# Patient Record
Sex: MALE | Race: WHITE | Employment: FULL TIME | URBAN - METROPOLITAN AREA
[De-identification: names, ages, dates, MRNs, and addresses within clinical notes are randomized per-mention and may not be internally consistent; named-entity substitution may affect disease eponyms.]

---

## 2018-09-21 ENCOUNTER — OFFICE VISIT (OUTPATIENT)
Dept: FAMILY MEDICINE CLINIC | Age: 43
End: 2018-09-21

## 2018-09-21 VITALS
BODY MASS INDEX: 31.09 KG/M2 | TEMPERATURE: 98.8 F | DIASTOLIC BLOOD PRESSURE: 102 MMHG | RESPIRATION RATE: 20 BRPM | HEART RATE: 105 BPM | SYSTOLIC BLOOD PRESSURE: 144 MMHG | OXYGEN SATURATION: 97 % | WEIGHT: 204.5 LBS

## 2018-09-21 DIAGNOSIS — Z13.220 LIPID SCREENING: ICD-10-CM

## 2018-09-21 DIAGNOSIS — F17.200 TOBACCO DEPENDENCE: ICD-10-CM

## 2018-09-21 DIAGNOSIS — K59.00 CONSTIPATION, UNSPECIFIED CONSTIPATION TYPE: ICD-10-CM

## 2018-09-21 DIAGNOSIS — R10.13 ACUTE EPIGASTRIC PAIN: Primary | ICD-10-CM

## 2018-09-21 PROCEDURE — 99214 OFFICE O/P EST MOD 30 MIN: CPT | Performed by: FAMILY MEDICINE

## 2018-09-21 PROCEDURE — G8417 CALC BMI ABV UP PARAM F/U: HCPCS | Performed by: FAMILY MEDICINE

## 2018-09-21 PROCEDURE — G8427 DOCREV CUR MEDS BY ELIG CLIN: HCPCS | Performed by: FAMILY MEDICINE

## 2018-09-21 PROCEDURE — 4004F PT TOBACCO SCREEN RCVD TLK: CPT | Performed by: FAMILY MEDICINE

## 2018-09-21 RX ORDER — OMEPRAZOLE 20 MG/1
20 CAPSULE, DELAYED RELEASE ORAL
Qty: 14 CAPSULE | Refills: 0 | Status: ON HOLD | OUTPATIENT
Start: 2018-09-21 | End: 2018-10-09

## 2018-09-21 RX ORDER — LACTULOSE 10 G/15ML
20 SOLUTION ORAL 2 TIMES DAILY PRN
Qty: 1 BOTTLE | Refills: 3 | Status: ON HOLD | OUTPATIENT
Start: 2018-09-21 | End: 2018-10-09 | Stop reason: ALTCHOICE

## 2018-09-21 RX ORDER — VARENICLINE TARTRATE 1 MG/1
1 TABLET, FILM COATED ORAL 2 TIMES DAILY
Qty: 60 TABLET | Refills: 3 | Status: SHIPPED | OUTPATIENT
Start: 2018-09-21 | End: 2019-01-30

## 2018-09-21 RX ORDER — VARENICLINE TARTRATE 25 MG
KIT ORAL
Qty: 1 EACH | Refills: 0 | Status: SHIPPED | OUTPATIENT
Start: 2018-09-21 | End: 2019-01-30

## 2018-09-21 NOTE — PATIENT INSTRUCTIONS
Flory  [address] [employer] [address], 10 [facility], [place], [address]  Phone: [phone] Fax: [phone]  Mon.Fri. 7 a. m.6 p.m. Sat. 8 a.m.on    [facility]  [address] [place] [address], [address]  Phone: [phone]  Mon.Fri. 7:30 a. m.4 p.m. [facility]  [address], [facility] [address]  Phone: [phone]    Fax: [phone]  Monday-Friday 8:00 a.m.- 4:30 p.m. 1418 [facility]  Critical access hospital 18, [address] [place] [address], 6500 [facility] [address]  Phone: [phone]  Mon.Fri. 8 a. m.8 p.m. Sat. 9 a. m.5 p.m. 6900 [facility]  [address]  [facility]. [address]  Phone: [phone] Fax: [phone]  Mon.Fri. 8 a. m.5 p.m. [facility]  [address] [place] [address]  Phone: [phone] Fax: [phone]  Mon.Fri. 7:30 a. m.4 p.m. [facility]  [address], [address], [address]  Phone: [phone]  Trae Gonzalez., Tue., Thu., Fri. 7:30 a. m.5 p.m.  Wed. 7:30 a.m.[facility]  [address]  Phone: [phone] Fax: [phone]  Mon.Fri. 7:30 a. m.4 p.m. [facility]  [address]  Phone: [phone] Fax: [phone]  Mon.Fri. 8 a. m.4:30 p.m. 506 [facility] and [facility] Services  [address], 414 [facility], [facility]  Phone: [phone] Fax: [phone]  Mon.Fri. 8 a. m.4:30 p.m. 1315 [facility]  [address] [place] [address], [address]  Phone: [phone] Fax: [phone]  Mon.Fri. 7 a. m.5 p.m. Sat. 8 a. m.4 p.m. [address] [place] [address]  Phone: [phone]  Mon.Fri. 7:30 a. m.4 p.m.     [facility]  [address] [place] [address]  Phone:

## 2018-09-21 NOTE — PROGRESS NOTES
medication use were addressed today. Understanding was acknowledged. Patient asked to follow-up if condition(s) do not improve as anticipated. Have her/him look online chantix. com for different methods of quitting. Most people choose gradual quit method: smoke as usual.  She/he will find that she will smoke less and might even be off cigarettes by the end of the fist month. It is important to fill the continuation pack and to take for at least a total of 3 months even if she/he has quit smoking on month #1. Common side effects are upset stomach and vivid dreams. If side effects are becoming excessive, temporarily back down to daily instead of BID, then eventually get back to twice a day. #3: The risks, benefits, potential side effects and barriers to medication use were addressed today. Understanding was acknowledged. Patient asked to follow-up if condition(s) do not improve as anticipated. PLAN          See rest of plan under patient instructions. Return if symptoms worsen or fail to improve. Patient Instructions       Kelli Cavanaugh 27 [address] [place] [address], 400 [facility]  Phone: [phone] Fax: [phone]  Mon.Fri. 7 a. m.6 p.m. Sat. 8 a.m.[facility]  [address] [address]  Phone: [phone]  Mon.Fri. 7:30 a. m.4 p.m. [place] [address]  Phone: [phone]    Fax: [phone]  Monday-Friday 8:00 a.m.- 4:30 p.m. 1418 [facility]  [address], 6500 [facility] [address]  Phone: [phone]  Mon.Fri. 8 a. m.8 p.m. Sat. 9 a. m.5 p.m. [address]  [facility]. [address]  Phone: [phone] Fax: [phone]  Mon.Fri. 8 a. m.5 p.m.     [address]  Phone: [phone] Fax: [phone]  Mon.Fri. 7:30 a. m.4 p.m. [facility]  555 [facility], [address], [address]  Phone: [phone]  Suzie Brooks., Tue., Thu., Fri. 7:30 a. m.5 p.m.  Wed. 7:30 a.m.[facility]  [address]  [place], [facility]  Phone: [phone] Fax: [phone]  Mon.Fri. 7:30 a. m.4 p.m. [facility]  [address], [facility] Partners[facility] [address]  Phone: [phone] Fax: [phone]  Mon.Fri. 8 a. m.4:30 p.m. 506 [facility] and [facility] Services  [facility] 189, 914 [facility], [facility]  Phone: [phone] Fax: [phone]  Mon.Fri. 8 a. m.4:30 p.m. 1315 [facility] and Urgent Care  [address] [place] [address] [address]  Phone: [phone] Fax: [phone]  Mon.Fri. 7 a. m.5 p.m. Sat. 8 a. m.4 p.m. [address]  Phone: [phone]  Mon.Fri. 7:30 a. m.4 p.m. [address] [facility] [address]  11[address] [facility]  Phone: [phone] Fax: [phone]  Mon.Fri. 7 a. m.5 p.m. Sat. 8 a.m.Noon [facility] [place], [address]  [place], [address]  Phone: [phone] Fax: [phone]  Mon.Fri. 7 a. m.5 p.m. [facility]""  [address] [facility] [address] [place], 400 [facility]  Phone: [phone] Fax: [phone]  Mon.Fri. 7 a. m.5 p.m. [address] [facility] [address]  475 [facility] [address], [place], 2501 [facility]  Phone: [phone]  Mon.Fri. 6:30 a. m.6 p.m. Sat. 7 a. m. 1 p.m. [facility] 75, [place], [facility]  Phone: [phone]  Open 24/7    [address], [address]  Phone: [phone]  Mon.Fri. 6 a. m.7 p.m. Sat. 7 a. m.3 p.m. [facility]  [address] [facility]  Phone: [phone]  Mon.Fri. 6 a. m. 11 p.m. Sat. Sun. 6:30 a. m. 88

## 2018-09-27 DIAGNOSIS — R10.13 ACUTE EPIGASTRIC PAIN: ICD-10-CM

## 2018-09-27 DIAGNOSIS — Z13.220 LIPID SCREENING: ICD-10-CM

## 2018-09-28 DIAGNOSIS — R74.8 ELEVATED LIPASE: Primary | ICD-10-CM

## 2018-09-28 DIAGNOSIS — R73.9 HYPERGLYCEMIA: ICD-10-CM

## 2018-09-28 LAB
A/G RATIO: 2.4 (ref 1.1–2.2)
ALBUMIN SERPL-MCNC: 4.5 G/DL (ref 3.4–5)
ALP BLD-CCNC: 94 U/L (ref 40–129)
ALT SERPL-CCNC: 19 U/L (ref 10–40)
ANION GAP SERPL CALCULATED.3IONS-SCNC: 11 MMOL/L (ref 3–16)
AST SERPL-CCNC: 12 U/L (ref 15–37)
BASOPHILS ABSOLUTE: 0.1 K/UL (ref 0–0.2)
BASOPHILS RELATIVE PERCENT: 1.3 %
BILIRUB SERPL-MCNC: <0.2 MG/DL (ref 0–1)
BUN BLDV-MCNC: 16 MG/DL (ref 7–20)
CALCIUM SERPL-MCNC: 9.6 MG/DL (ref 8.3–10.6)
CHLORIDE BLD-SCNC: 103 MMOL/L (ref 99–110)
CHOLESTEROL, TOTAL: 161 MG/DL (ref 0–199)
CO2: 27 MMOL/L (ref 21–32)
CREAT SERPL-MCNC: 1 MG/DL (ref 0.9–1.3)
EOSINOPHILS ABSOLUTE: 0.2 K/UL (ref 0–0.6)
EOSINOPHILS RELATIVE PERCENT: 2.1 %
GFR AFRICAN AMERICAN: >60
GFR NON-AFRICAN AMERICAN: >60
GLOBULIN: 1.9 G/DL
GLUCOSE BLD-MCNC: 282 MG/DL (ref 70–99)
HCT VFR BLD CALC: 44.2 % (ref 40.5–52.5)
HDLC SERPL-MCNC: 24 MG/DL (ref 40–60)
HEMOGLOBIN: 14.9 G/DL (ref 13.5–17.5)
LDL CHOLESTEROL CALCULATED: 117 MG/DL
LIPASE: 144 U/L (ref 13–60)
LYMPHOCYTES ABSOLUTE: 2.7 K/UL (ref 1–5.1)
LYMPHOCYTES RELATIVE PERCENT: 30.1 %
MCH RBC QN AUTO: 30.1 PG (ref 26–34)
MCHC RBC AUTO-ENTMCNC: 33.8 G/DL (ref 31–36)
MCV RBC AUTO: 89 FL (ref 80–100)
MONOCYTES ABSOLUTE: 0.6 K/UL (ref 0–1.3)
MONOCYTES RELATIVE PERCENT: 7.2 %
NEUTROPHILS ABSOLUTE: 5.2 K/UL (ref 1.7–7.7)
NEUTROPHILS RELATIVE PERCENT: 59.3 %
PDW BLD-RTO: 12.4 % (ref 12.4–15.4)
PLATELET # BLD: 245 K/UL (ref 135–450)
PMV BLD AUTO: 9.2 FL (ref 5–10.5)
POTASSIUM SERPL-SCNC: 4.5 MMOL/L (ref 3.5–5.1)
RBC # BLD: 4.96 M/UL (ref 4.2–5.9)
SODIUM BLD-SCNC: 141 MMOL/L (ref 136–145)
TOTAL PROTEIN: 6.4 G/DL (ref 6.4–8.2)
TRIGL SERPL-MCNC: 102 MG/DL (ref 0–150)
VLDLC SERPL CALC-MCNC: 20 MG/DL
WBC # BLD: 8.8 K/UL (ref 4–11)

## 2018-10-04 ENCOUNTER — HOSPITAL ENCOUNTER (OUTPATIENT)
Dept: CT IMAGING | Age: 43
Discharge: HOME | End: 2018-10-04
Payer: COMMERCIAL

## 2018-10-04 ENCOUNTER — TELEPHONE (OUTPATIENT)
Dept: FAMILY MEDICINE CLINIC | Age: 43
End: 2018-10-04

## 2018-10-04 DIAGNOSIS — R74.8 ELEVATED LIPASE: ICD-10-CM

## 2018-10-04 DIAGNOSIS — K85.90 ACUTE PANCREATITIS, UNSPECIFIED COMPLICATION STATUS, UNSPECIFIED PANCREATITIS TYPE: Primary | ICD-10-CM

## 2018-10-04 DIAGNOSIS — R73.9 HYPERGLYCEMIA: ICD-10-CM

## 2018-10-04 PROCEDURE — 6360000004 HC RX CONTRAST MEDICATION: Performed by: FAMILY MEDICINE

## 2018-10-04 PROCEDURE — 74177 CT ABD & PELVIS W/CONTRAST: CPT

## 2018-10-04 RX ORDER — ACETAMINOPHEN AND CODEINE PHOSPHATE 300; 30 MG/1; MG/1
1-2 TABLET ORAL EVERY 6 HOURS PRN
Qty: 18 TABLET | Refills: 0 | Status: SHIPPED | OUTPATIENT
Start: 2018-10-04 | End: 2019-01-30 | Stop reason: SDUPTHER

## 2018-10-04 RX ADMIN — IOPAMIDOL 80 ML: 755 INJECTION, SOLUTION INTRAVENOUS at 12:37

## 2018-10-04 RX ADMIN — IOHEXOL 50 ML: 240 INJECTION, SOLUTION INTRATHECAL; INTRAVASCULAR; INTRAVENOUS; ORAL at 12:38

## 2018-10-04 NOTE — TELEPHONE ENCOUNTER
I spoke to patient for 15 minutes about the CT scan and next steps. \I informed him of the pancreatitis findings. I informed them of gallstones but no evidence of active gallbladder disease. His pain is sometimes moderate and epigastric region but without any vomiting. He will have medication off next week    I advised: going on a pancreatitis diet. He agrees to meet with a gastroenterologist.  I will refer him to one of our [facility] gastroenterologist in [facility]. Agrees to abstain from alcohol as he only drinks 3 times in the last 3 months. I urged him to quit smoking and he is on day 8 of Chantix.

## 2018-10-05 ENCOUNTER — HOSPITAL ENCOUNTER (OUTPATIENT)
Age: 43
Discharge: HOME | End: 2018-10-05
Payer: COMMERCIAL

## 2018-10-05 ENCOUNTER — INITIAL CONSULT (OUTPATIENT)
Dept: GASTROENTEROLOGY | Age: 43
End: 2018-10-05
Payer: COMMERCIAL

## 2018-10-05 VITALS
SYSTOLIC BLOOD PRESSURE: 130 MMHG | BODY MASS INDEX: 30.62 KG/M2 | DIASTOLIC BLOOD PRESSURE: 80 MMHG | HEIGHT: 68 IN | WEIGHT: 202 LBS

## 2018-10-05 DIAGNOSIS — K76.0 FATTY LIVER: ICD-10-CM

## 2018-10-05 DIAGNOSIS — K85.00 IDIOPATHIC ACUTE PANCREATITIS, UNSPECIFIED COMPLICATION STATUS: Primary | ICD-10-CM

## 2018-10-05 DIAGNOSIS — R73.9 HYPERGLYCEMIA: ICD-10-CM

## 2018-10-05 LAB — GLUCOSE BLD-MCNC: 255 MG/DL (ref 70–99)

## 2018-10-05 PROCEDURE — G8417 CALC BMI ABV UP PARAM F/U: HCPCS | Performed by: INTERNAL MEDICINE

## 2018-10-05 PROCEDURE — 99204 OFFICE O/P NEW MOD 45 MIN: CPT | Performed by: INTERNAL MEDICINE

## 2018-10-05 PROCEDURE — 4004F PT TOBACCO SCREEN RCVD TLK: CPT | Performed by: INTERNAL MEDICINE

## 2018-10-05 PROCEDURE — G8484 FLU IMMUNIZE NO ADMIN: HCPCS | Performed by: INTERNAL MEDICINE

## 2018-10-05 PROCEDURE — 36415 COLL VENOUS BLD VENIPUNCTURE: CPT

## 2018-10-05 PROCEDURE — G8427 DOCREV CUR MEDS BY ELIG CLIN: HCPCS | Performed by: INTERNAL MEDICINE

## 2018-10-05 PROCEDURE — 82947 ASSAY GLUCOSE BLOOD QUANT: CPT

## 2018-10-05 RX ORDER — OMEPRAZOLE 40 MG/1
40 CAPSULE, DELAYED RELEASE ORAL DAILY
Qty: 30 CAPSULE | Refills: 1 | Status: SHIPPED | OUTPATIENT
Start: 2018-10-05 | End: 2018-11-19 | Stop reason: SDUPTHER

## 2018-10-05 NOTE — LETTER
[address]  [place], [facility]  Estefani Rivero [phone]  F [phone]    10/05/18  Patient: Maura Ferraro   MR Number: [id number]   YOB: 1975   Date of Visit: 10/5/18     Dear Dr. Taniya Paez, DO    Thank you for the request for consultation for Maura Ferraro to me for the evaluation of   Chief Complaint   Patient presents with   [address]     NP- acute pancreatitis   . Below are the relevant portions of my assessment and plan of care. FINAL DIAGNOSIS/Assessment   Diagnosis Orders   1. Idiopathic acute pancreatitis, unspecified complication status  omeprazole (PRILOSEC) 40 MG delayed release capsule    EGD       VISIT ORDERS/Plan  Orders Placed This Encounter   Procedures    EGD     Standing Status:   Future     Standing Expiration Date:   11/5/2018     Order Specific Question:   Screening or Diagnostic? Answer:   Diagnostic       If you have questions, please do not hesitate to call me. I look forward to following Maura Ferraro along with you.     Sincerely,        Sandra Mayo 21 10/5/18 3:28 PM

## 2018-10-05 NOTE — LETTER
EGD PREP [address] ENDOSCOPY    Your EGD is scheduled on: ____10/9/18____    __Viry/Simone______  Arrival Time: ___9:20 AM___   DO NOT EAT OR DRINK (INCLUDING WATER)  AFTER: midnight, the night before your procedure  's Name_[facility] [phone]  [facility]- [phone]    Keep these papers together; REVIEW ALL OF THEM AT LEAST 7 DAYS BEFORE THE PROCEDURE. Please complete all paperwork; including a current list of your medications, to avoid delays in the admission process. The following instructions must be followed in order to ensure your procedure has optimal outcomes. - KEEP YOUR APPOINTMENT. If for any reason, you are unable to keep your appointment, please notify us within 72 hours before your procedure. - You MUST have a responsible adult to drive you, who MUST remain at our facility the ENTIRE time. If not the procedure will be cancelled. You may go by taxi ONLY if you have a responsible adult with you. You may experience light headedness, dizziness etc., therefore you should have a responsible adult remain with you until the morning after your procedure. - Bring your insurance card and 's license. Call your insurance carrier to verify your benefits, and confirm that our facility is in your network, prior to the procedure date to ensure coverage. The facility name is listed as [facility]010 and the tax ID# is [id number].  - Due to the safety and privacy of our patients, only one visitor is allowed in the recovery area after the procedure. The center will not be responsible for lost valuables so please leave them at home.   - If you currently take Aggrenox, Dipyridamole, Persantine, Fondaparinux sodium (Arixtra), Dalteparin sodium Bella Margarita), Warfarin (Coumadin), Clopidogrel (Plavix), Prasugrel (Effient), Enoxaparin, Lovenox, or Heparin, Cilostazol (Pletal), Rivoroxaban (Xarelto), Desirudin (Iprivask), Cyclopentyltrazolopyrimide (Ticagrelor or Brilinta), Cangrelor (Kengreal), Dabigatran (Pradaxa), Apixaban (Eliquis), Edoxaban (Savaysa)you should have received instructions regarding if and when to discontinue the medication. If you have not, or do not clearly understand the instructions, please call the office for clarification. MORNING OF PROCEDURE  1. Take your Blood pressure, Heart and Seizure medication the morning of the procedure with sips of water. 2. Bring inhalers with you. 3. Do not take your Diabetic medication the morning of procedure. Dear Patient,      Magnus Edwards will receive a call from the [facility] pre-registration department prior to your GI procedure. This will help streamline your check-in process on the day of service. During this call a skilled associate will review your demographic and insurance benefits information. The associate will answer any question pertaining to your insurance contract, such as deductible/co-insurance/ co-pay or any other financial concerns. They may offer an amount that you could pay on the day of surgery but this is not a requirement. Thank you for allowing [facility] to be part of your [address]  [address], [facility] [address] Critical access hospital Is located at the intersection of [address] [facility] and [address], next to [facility]. From [address]: Exit at [employer]. Travel on [address] past [facility] and turn right onto [address].  Then turn left into the main driveway facing the [facility], bare to the right of the driveway and look for the building to the right of the hospital.

## 2018-10-07 PROBLEM — K85.00 IDIOPATHIC ACUTE PANCREATITIS: Status: ACTIVE | Noted: 2018-10-07

## 2018-10-07 PROBLEM — K76.0 FATTY LIVER: Status: ACTIVE | Noted: 2018-10-07

## 2018-10-07 NOTE — PROGRESS NOTES
Please call patient with results. Since this is fasting, he now has diabetes and needs to follow up with his pcp.

## 2018-10-09 ENCOUNTER — HOSPITAL ENCOUNTER (OUTPATIENT)
Age: 43
Setting detail: OUTPATIENT SURGERY
Discharge: HOME | End: 2018-10-09
Attending: INTERNAL MEDICINE | Admitting: INTERNAL MEDICINE
Payer: COMMERCIAL

## 2018-10-09 ENCOUNTER — TELEPHONE (OUTPATIENT)
Dept: SURGERY | Age: 43
End: 2018-10-09

## 2018-10-09 VITALS
BODY MASS INDEX: 30.62 KG/M2 | DIASTOLIC BLOOD PRESSURE: 83 MMHG | WEIGHT: 202 LBS | OXYGEN SATURATION: 93 % | TEMPERATURE: 98 F | HEIGHT: 68 IN | HEART RATE: 93 BPM | RESPIRATION RATE: 16 BRPM | SYSTOLIC BLOOD PRESSURE: 132 MMHG

## 2018-10-09 DIAGNOSIS — K85.00 IDIOPATHIC ACUTE PANCREATITIS, UNSPECIFIED COMPLICATION STATUS: Primary | ICD-10-CM

## 2018-10-09 PROCEDURE — 7100000010 HC PHASE II RECOVERY - FIRST 15 MIN: Performed by: INTERNAL MEDICINE

## 2018-10-09 PROCEDURE — 2709999900 HC NON-CHARGEABLE SUPPLY: Performed by: INTERNAL MEDICINE

## 2018-10-09 PROCEDURE — 99152 MOD SED SAME PHYS/QHP 5/>YRS: CPT | Performed by: INTERNAL MEDICINE

## 2018-10-09 PROCEDURE — 6360000002 HC RX W HCPCS: Performed by: INTERNAL MEDICINE

## 2018-10-09 PROCEDURE — 43239 EGD BIOPSY SINGLE/MULTIPLE: CPT | Performed by: INTERNAL MEDICINE

## 2018-10-09 PROCEDURE — 3609012400 HC EGD TRANSORAL BIOPSY SINGLE/MULTIPLE: Performed by: INTERNAL MEDICINE

## 2018-10-09 PROCEDURE — 7100000011 HC PHASE II RECOVERY - ADDTL 15 MIN: Performed by: INTERNAL MEDICINE

## 2018-10-09 PROCEDURE — 88305 TISSUE EXAM BY PATHOLOGIST: CPT

## 2018-10-09 PROCEDURE — 82787 IGG 1 2 3 OR 4 EACH: CPT

## 2018-10-09 PROCEDURE — 2580000003 HC RX 258: Performed by: INTERNAL MEDICINE

## 2018-10-09 RX ORDER — FENTANYL CITRATE 50 UG/ML
INJECTION, SOLUTION INTRAMUSCULAR; INTRAVENOUS PRN
Status: DISCONTINUED | OUTPATIENT
Start: 2018-10-09 | End: 2018-10-09 | Stop reason: HOSPADM

## 2018-10-09 RX ORDER — SODIUM CHLORIDE, SODIUM LACTATE, POTASSIUM CHLORIDE, CALCIUM CHLORIDE 600; 310; 30; 20 MG/100ML; MG/100ML; MG/100ML; MG/100ML
INJECTION, SOLUTION INTRAVENOUS CONTINUOUS
Status: DISCONTINUED | OUTPATIENT
Start: 2018-10-09 | End: 2018-10-09 | Stop reason: HOSPADM

## 2018-10-09 RX ORDER — MIDAZOLAM HYDROCHLORIDE 5 MG/ML
INJECTION INTRAMUSCULAR; INTRAVENOUS PRN
Status: DISCONTINUED | OUTPATIENT
Start: 2018-10-09 | End: 2018-10-09 | Stop reason: HOSPADM

## 2018-10-09 RX ADMIN — SODIUM CHLORIDE, POTASSIUM CHLORIDE, SODIUM LACTATE AND CALCIUM CHLORIDE: 600; 310; 30; 20 INJECTION, SOLUTION INTRAVENOUS at 10:42

## 2018-10-09 ASSESSMENT — PAIN - FUNCTIONAL ASSESSMENT: PAIN_FUNCTIONAL_ASSESSMENT: 0-10

## 2018-10-11 ENCOUNTER — TELEPHONE (OUTPATIENT)
Dept: FAMILY MEDICINE CLINIC | Age: 43
End: 2018-10-11

## 2018-10-11 DIAGNOSIS — K85.00 IDIOPATHIC ACUTE PANCREATITIS, UNSPECIFIED COMPLICATION STATUS: Primary | ICD-10-CM

## 2018-10-11 LAB — MISCELLANEOUS LAB TEST ORDER: NORMAL

## 2018-10-16 ENCOUNTER — OFFICE VISIT (OUTPATIENT)
Dept: FAMILY MEDICINE CLINIC | Age: 43
End: 2018-10-16
Payer: COMMERCIAL

## 2018-10-16 VITALS
HEIGHT: 68 IN | BODY MASS INDEX: 30.8 KG/M2 | WEIGHT: 203.2 LBS | DIASTOLIC BLOOD PRESSURE: 93 MMHG | OXYGEN SATURATION: 95 % | HEART RATE: 106 BPM | SYSTOLIC BLOOD PRESSURE: 130 MMHG | TEMPERATURE: 98.5 F

## 2018-10-16 DIAGNOSIS — E11.9 NEW ONSET TYPE 2 DIABETES MELLITUS (HCC): Primary | ICD-10-CM

## 2018-10-16 LAB — HBA1C MFR BLD: 12.2 %

## 2018-10-16 PROCEDURE — G8484 FLU IMMUNIZE NO ADMIN: HCPCS | Performed by: FAMILY MEDICINE

## 2018-10-16 PROCEDURE — 4004F PT TOBACCO SCREEN RCVD TLK: CPT | Performed by: FAMILY MEDICINE

## 2018-10-16 PROCEDURE — G8427 DOCREV CUR MEDS BY ELIG CLIN: HCPCS | Performed by: FAMILY MEDICINE

## 2018-10-16 PROCEDURE — G8417 CALC BMI ABV UP PARAM F/U: HCPCS | Performed by: FAMILY MEDICINE

## 2018-10-16 PROCEDURE — 99214 OFFICE O/P EST MOD 30 MIN: CPT | Performed by: FAMILY MEDICINE

## 2018-10-16 PROCEDURE — 83036 HEMOGLOBIN GLYCOSYLATED A1C: CPT | Performed by: FAMILY MEDICINE

## 2018-10-16 PROCEDURE — 2022F DILAT RTA XM EVC RTNOPTHY: CPT | Performed by: FAMILY MEDICINE

## 2018-10-16 PROCEDURE — 3046F HEMOGLOBIN A1C LEVEL >9.0%: CPT | Performed by: FAMILY MEDICINE

## 2018-10-16 RX ORDER — PIOGLITAZONEHYDROCHLORIDE 30 MG/1
30 TABLET ORAL DAILY
Qty: 30 TABLET | Refills: 3 | Status: SHIPPED | OUTPATIENT
Start: 2018-10-16 | End: 2018-12-03

## 2018-10-16 ASSESSMENT — PATIENT HEALTH QUESTIONNAIRE - PHQ9
1. LITTLE INTEREST OR PLEASURE IN DOING THINGS: 0
2. FEELING DOWN, DEPRESSED OR HOPELESS: 0
SUM OF ALL RESPONSES TO PHQ QUESTIONS 1-9: 0
SUM OF ALL RESPONSES TO PHQ QUESTIONS 1-9: 0
SUM OF ALL RESPONSES TO PHQ9 QUESTIONS 1 & 2: 0

## 2018-10-16 NOTE — PATIENT INSTRUCTIONS
1) Do what you can to ease and stop smoking. 2) Plan bloodwork in 6 to 8 weeks. 3) Begin the Actos. Take once a day. 4) Keep f/u plans with your digestive doctor to include the MRI. 5) Follow-up in this clinic a few days after you bloodwork in December. 6) Review the booklet. Let your PCP know at anytime if you want to meet with a diabetes educator. 7) A diet of more protein and less carbs is advised.

## 2018-10-16 NOTE — PROGRESS NOTES
[facility]  Progress Note  Lyudmila DO Gela Barboza  1975    10/17/18    Chief Complaint:   Gela Silveira is a 39 y.o. male who is here for new onset diabetes    HPI:   Diabetes Mellitus Type II, Initial Visit: Patient here for an initial evaluation of Type 2 diabetes mellitus. Current symptoms/problems include hyperglycemia and and abdominal pain that have since improved and have been improving and has had GI workup to include EGD showing gastritis and scheduled MRI of abdomen. Symptoms have been present for about 1 month and improvinig. The patient was initially diagnosed with Type 2 diabetes mellitus based on the following criteria:  Glucose > 200 and A1C in office of 12%. Known diabetic complications: none  Cardiovascular risk factors: male gender  Current diabetic medications include none. Eye exam current (within one year): no  Weight trend: stable  Prior visit with dietician: no  Current diet: in general, an \"unhealthy\" diet  Current exercise: no regular exercise and has job with irregular hours. Current monitoring regimen: none  Home blood sugar records: patient does not test  Any episodes of hypoglycemia? no      Lab Results   Component Value Date    LABA1C 12.2 10/16/2018     Lab Results   Component Value Date    CREATININE 1.0 09/27/2018     Lab Results   Component Value Date    ALT 19 09/27/2018    AST 12 (L) 09/27/2018     Lab Results   Component Value Date    CHOL 161 09/27/2018    TRIG 102 09/27/2018    HDL 24 (L) 09/27/2018    LDLCALC 117 (H) 09/27/2018       Is He on ACE inhibitor or angiotensin II receptor blocker? No         Did biometric screening with wellness years ago. ROS negative for headache, vision changes, chest pain, shortness of breath, abdominal pain, urinary sx, bowel changes. Past medical, surgical, and social history reviewed. Medications and allergies reviewed.     Allergies   Allergen Reactions    Sulfa Antibiotics Itching and Other (See Comments)     Skin turns red     Prior to Visit Medications    Medication Sig Taking? Authorizing Provider   pioglitazone (ACTOS) 30 MG tablet Take 1 tablet by mouth daily Yes Maritza Mcclelland DO   omeprazole (PRILOSEC) 40 MG delayed release capsule Take 1 capsule by mouth daily Yes Meena Kennedy MD   varenicline (CHANTIX STARTING MONTH PAK) 0.5 MG X 11 & 1 MG X 42 tablet Take as directed on package. Yes Maritza Mcclelland DO   varenicline (CHANTIX CONTINUING MONTH PAK) 1 MG tablet Take 1 tablet by mouth 2 times daily Yes Maritza Mcclelland DO          Vitals:    10/16/18 1445   BP: (!) 130/93   Site: Left Upper Arm   Position: Sitting   Cuff Size: Large Adult   Pulse: 106   Temp: 98.5 °F (36.9 °C)   TempSrc: Oral   SpO2: 95%   Weight: 203 lb 3.2 oz (92.2 kg)   Height: 5' 8\" (1.727 m)      Wt Readings from Last 3 Encounters:   10/16/18 203 lb 3.2 oz (92.2 kg)   10/09/18 202 lb (91.6 kg)   10/05/18 202 lb (91.6 kg)     BP Readings from Last 3 Encounters:   10/16/18 (!) 130/93   10/09/18 132/83   10/05/18 130/80       Patient Active Problem List   Diagnosis    Tobacco dependence    Fatty liver    Idiopathic acute pancreatitis    Gastritis without bleeding    Schatzki's ring of distal esophagus         There is no immunization history on file for this patient. No past medical history on file. Past Surgical History:   Procedure Laterality Date    UPPER GASTROINTESTINAL ENDOSCOPY N/A 10/9/2018    EGD BIOPSY [facility] Meena Kennedy MD [facility]     Family History   Problem Relation Age of Onset    Cancer Father 61    Diabetes Brother     Diabetes Maternal Grandmother     Diabetes Paternal Grandmother      Social History     Social History    Marital status:      Spouse name: N/A    Number of children: N/A    Years of education: N/A     Occupational History    Not on file.      Social History Main Topics    Smoking status: Current

## 2018-10-26 ENCOUNTER — HOSPITAL ENCOUNTER (OUTPATIENT)
Dept: MRI IMAGING | Age: 43
Discharge: HOME | End: 2018-10-26
Payer: COMMERCIAL

## 2018-10-26 DIAGNOSIS — K85.00 IDIOPATHIC ACUTE PANCREATITIS, UNSPECIFIED COMPLICATION STATUS: ICD-10-CM

## 2018-10-26 PROCEDURE — 74181 MRI ABDOMEN W/O CONTRAST: CPT

## 2018-10-29 ENCOUNTER — TELEPHONE (OUTPATIENT)
Dept: GASTROENTEROLOGY | Age: 43
End: 2018-10-29

## 2018-10-30 ENCOUNTER — TELEPHONE (OUTPATIENT)
Dept: SURGERY | Age: 43
End: 2018-10-30

## 2018-11-08 ENCOUNTER — HOSPITAL ENCOUNTER (OUTPATIENT)
Dept: ENDOSCOPY | Age: 43
Setting detail: OUTPATIENT SURGERY
Discharge: HOME | End: 2018-11-08
Payer: COMMERCIAL

## 2018-11-08 PROCEDURE — 91200 LIVER ELASTOGRAPHY: CPT | Performed by: INTERNAL MEDICINE

## 2018-11-08 PROCEDURE — 91200 LIVER ELASTOGRAPHY: CPT

## 2018-11-09 ENCOUNTER — TELEPHONE (OUTPATIENT)
Dept: GASTROENTEROLOGY | Age: 43
End: 2018-11-09

## 2018-11-15 ENCOUNTER — TELEPHONE (OUTPATIENT)
Dept: FAMILY MEDICINE CLINIC | Age: 43
End: 2018-11-15

## 2018-11-15 NOTE — TELEPHONE ENCOUNTER
Please see the commend from the [facility] from 11/9/2018 Tel Enc as copy and pasted below:    Pt needs to be made aware that Fibroscan shows F0-F1 no-mild fibrosis and fatty liver. Healthy lifestyle/weight loss/exercise and Vitamin E 400 units BID recommended. I personally tried the [phone] phone number and called just after 5 pm and got the on-call service. I think Dr. Mena Pink will be okay if my office staff read the message above verbatim. [facility] office staff: read message verbatim. He will have to follow-up or keep calling to get more details. I will CC our practice manager to see if my office has to stop referring to [address] for now.

## 2018-11-19 DIAGNOSIS — K85.00 IDIOPATHIC ACUTE PANCREATITIS, UNSPECIFIED COMPLICATION STATUS: ICD-10-CM

## 2018-11-19 RX ORDER — OMEPRAZOLE 40 MG/1
CAPSULE, DELAYED RELEASE ORAL
Qty: 30 CAPSULE | Refills: 0 | Status: SHIPPED | OUTPATIENT
Start: 2018-11-19 | End: 2018-12-07 | Stop reason: SDUPTHER

## 2018-11-25 ENCOUNTER — TELEPHONE (OUTPATIENT)
Dept: FAMILY MEDICINE CLINIC | Age: 43
End: 2018-11-25

## 2018-11-30 DIAGNOSIS — E11.9 NEW ONSET TYPE 2 DIABETES MELLITUS (HCC): ICD-10-CM

## 2018-11-30 LAB
A/G RATIO: 2.1 (ref 1.1–2.2)
ALBUMIN SERPL-MCNC: 4.6 G/DL (ref 3.4–5)
ALP BLD-CCNC: 85 U/L (ref 40–129)
ALT SERPL-CCNC: 15 U/L (ref 10–40)
ANION GAP SERPL CALCULATED.3IONS-SCNC: 14 MMOL/L (ref 3–16)
AST SERPL-CCNC: 9 U/L (ref 15–37)
BILIRUB SERPL-MCNC: 0.3 MG/DL (ref 0–1)
BUN BLDV-MCNC: 24 MG/DL (ref 7–20)
CALCIUM SERPL-MCNC: 9.4 MG/DL (ref 8.3–10.6)
CHLORIDE BLD-SCNC: 104 MMOL/L (ref 99–110)
CO2: 25 MMOL/L (ref 21–32)
CREAT SERPL-MCNC: 0.9 MG/DL (ref 0.9–1.3)
GFR AFRICAN AMERICAN: >60
GFR NON-AFRICAN AMERICAN: >60
GLOBULIN: 2.2 G/DL
GLUCOSE BLD-MCNC: 98 MG/DL (ref 70–99)
LIPASE: 19 U/L (ref 13–60)
POTASSIUM SERPL-SCNC: 4.7 MMOL/L (ref 3.5–5.1)
SODIUM BLD-SCNC: 143 MMOL/L (ref 136–145)
TOTAL PROTEIN: 6.8 G/DL (ref 6.4–8.2)

## 2018-12-01 LAB
ESTIMATED AVERAGE GLUCOSE: 214.5 MG/DL
HBA1C MFR BLD: 9.1 %

## 2018-12-03 ENCOUNTER — OFFICE VISIT (OUTPATIENT)
Dept: FAMILY MEDICINE CLINIC | Age: 43
End: 2018-12-03
Payer: COMMERCIAL

## 2018-12-03 VITALS
DIASTOLIC BLOOD PRESSURE: 84 MMHG | HEIGHT: 68 IN | OXYGEN SATURATION: 96 % | HEART RATE: 105 BPM | SYSTOLIC BLOOD PRESSURE: 128 MMHG | RESPIRATION RATE: 12 BRPM | BODY MASS INDEX: 31.22 KG/M2 | WEIGHT: 206 LBS | TEMPERATURE: 98.5 F

## 2018-12-03 DIAGNOSIS — L02.11 NECK ABSCESS: ICD-10-CM

## 2018-12-03 DIAGNOSIS — E11.9 TYPE 2 DIABETES MELLITUS WITH HEMOGLOBIN A1C GOAL OF 7.0%-8.0% (HCC): Primary | ICD-10-CM

## 2018-12-03 PROCEDURE — G8417 CALC BMI ABV UP PARAM F/U: HCPCS | Performed by: FAMILY MEDICINE

## 2018-12-03 PROCEDURE — G8427 DOCREV CUR MEDS BY ELIG CLIN: HCPCS | Performed by: FAMILY MEDICINE

## 2018-12-03 PROCEDURE — 3046F HEMOGLOBIN A1C LEVEL >9.0%: CPT | Performed by: FAMILY MEDICINE

## 2018-12-03 PROCEDURE — 2022F DILAT RTA XM EVC RTNOPTHY: CPT | Performed by: FAMILY MEDICINE

## 2018-12-03 PROCEDURE — 99214 OFFICE O/P EST MOD 30 MIN: CPT | Performed by: FAMILY MEDICINE

## 2018-12-03 PROCEDURE — G8484 FLU IMMUNIZE NO ADMIN: HCPCS | Performed by: FAMILY MEDICINE

## 2018-12-03 PROCEDURE — 4004F PT TOBACCO SCREEN RCVD TLK: CPT | Performed by: FAMILY MEDICINE

## 2018-12-03 RX ORDER — ATORVASTATIN CALCIUM 10 MG/1
10 TABLET, FILM COATED ORAL DAILY
Qty: 90 TABLET | Refills: 1 | Status: SHIPPED | OUTPATIENT
Start: 2018-12-03 | End: 2019-05-04 | Stop reason: SDUPTHER

## 2018-12-03 RX ORDER — PIOGLITAZONEHYDROCHLORIDE 45 MG/1
45 TABLET ORAL DAILY
Qty: 30 TABLET | Refills: 5 | Status: SHIPPED | OUTPATIENT
Start: 2018-12-03 | End: 2019-02-28 | Stop reason: ALTCHOICE

## 2018-12-03 RX ORDER — LISINOPRIL 5 MG/1
5 TABLET ORAL DAILY
Qty: 90 TABLET | Refills: 1 | Status: SHIPPED | OUTPATIENT
Start: 2018-12-03 | End: 2018-12-03 | Stop reason: ALTCHOICE

## 2018-12-03 RX ORDER — LISINOPRIL 5 MG/1
5 TABLET ORAL DAILY
Qty: 90 TABLET | Refills: 1 | Status: SHIPPED | OUTPATIENT
Start: 2018-12-03 | End: 2019-07-30

## 2018-12-05 ENCOUNTER — TELEPHONE (OUTPATIENT)
Dept: FAMILY MEDICINE CLINIC | Age: 43
End: 2018-12-05

## 2018-12-06 RX ORDER — CEPHALEXIN 500 MG/1
500 CAPSULE ORAL 4 TIMES DAILY
Qty: 30 CAPSULE | Refills: 0 | Status: SHIPPED | OUTPATIENT
Start: 2018-12-06 | End: 2019-01-30

## 2018-12-07 DIAGNOSIS — K85.00 IDIOPATHIC ACUTE PANCREATITIS, UNSPECIFIED COMPLICATION STATUS: ICD-10-CM

## 2018-12-07 RX ORDER — OMEPRAZOLE 40 MG/1
CAPSULE, DELAYED RELEASE ORAL
Qty: 30 CAPSULE | Refills: 0 | Status: SHIPPED | OUTPATIENT
Start: 2018-12-07 | End: 2018-12-12 | Stop reason: SDUPTHER

## 2018-12-12 DIAGNOSIS — K85.00 IDIOPATHIC ACUTE PANCREATITIS, UNSPECIFIED COMPLICATION STATUS: ICD-10-CM

## 2018-12-13 RX ORDER — OMEPRAZOLE 40 MG/1
CAPSULE, DELAYED RELEASE ORAL
Qty: 30 CAPSULE | Refills: 0 | Status: SHIPPED | OUTPATIENT
Start: 2018-12-13 | End: 2019-01-08 | Stop reason: SDUPTHER

## 2018-12-17 ENCOUNTER — TELEPHONE (OUTPATIENT)
Dept: FAMILY MEDICINE CLINIC | Age: 43
End: 2018-12-17

## 2018-12-17 DIAGNOSIS — E11.9 DM TYPE 2, GOAL HBA1C 7%-8% (HCC): Primary | ICD-10-CM

## 2018-12-17 NOTE — TELEPHONE ENCOUNTER
Pt said at last appointment talked about Glucose Monitor to check sugars.  He would like one ordered and sent to [facility] 1200 [facility], [address] [place] [address]

## 2018-12-18 RX ORDER — BLOOD-GLUCOSE METER
1 EACH MISCELLANEOUS
Qty: 1 KIT | Refills: 0 | Status: SHIPPED | OUTPATIENT
Start: 2018-12-18 | End: 2021-03-17

## 2018-12-18 RX ORDER — LANCETS
1 EACH MISCELLANEOUS
Qty: 100 EACH | Refills: 3 | Status: SHIPPED | OUTPATIENT
Start: 2018-12-18 | End: 2021-03-17

## 2019-01-08 DIAGNOSIS — K85.00 IDIOPATHIC ACUTE PANCREATITIS, UNSPECIFIED COMPLICATION STATUS: ICD-10-CM

## 2019-01-11 RX ORDER — OMEPRAZOLE 40 MG/1
CAPSULE, DELAYED RELEASE ORAL
Qty: 30 CAPSULE | Refills: 10 | Status: SHIPPED | OUTPATIENT
Start: 2019-01-11 | End: 2019-09-20 | Stop reason: ALTCHOICE

## 2019-01-28 DIAGNOSIS — E11.9 TYPE 2 DIABETES MELLITUS WITH HEMOGLOBIN A1C GOAL OF 7.0%-8.0% (HCC): ICD-10-CM

## 2019-01-28 LAB
A/G RATIO: 2.4 (ref 1.1–2.2)
ALBUMIN SERPL-MCNC: 4.8 G/DL (ref 3.4–5)
ALP BLD-CCNC: 73 U/L (ref 40–129)
ALT SERPL-CCNC: 22 U/L (ref 10–40)
ANION GAP SERPL CALCULATED.3IONS-SCNC: 12 MMOL/L (ref 3–16)
AST SERPL-CCNC: 14 U/L (ref 15–37)
BILIRUB SERPL-MCNC: 0.3 MG/DL (ref 0–1)
BUN BLDV-MCNC: 20 MG/DL (ref 7–20)
CALCIUM SERPL-MCNC: 9.7 MG/DL (ref 8.3–10.6)
CHLORIDE BLD-SCNC: 103 MMOL/L (ref 99–110)
CHOLESTEROL, TOTAL: 130 MG/DL (ref 0–199)
CO2: 26 MMOL/L (ref 21–32)
CREAT SERPL-MCNC: 0.9 MG/DL (ref 0.9–1.3)
GFR AFRICAN AMERICAN: >60
GFR NON-AFRICAN AMERICAN: >60
GLOBULIN: 2 G/DL
GLUCOSE BLD-MCNC: 129 MG/DL (ref 70–99)
HDLC SERPL-MCNC: 36 MG/DL (ref 40–60)
LDL CHOLESTEROL CALCULATED: 80 MG/DL
POTASSIUM SERPL-SCNC: 4.7 MMOL/L (ref 3.5–5.1)
SODIUM BLD-SCNC: 141 MMOL/L (ref 136–145)
TOTAL PROTEIN: 6.8 G/DL (ref 6.4–8.2)
TRIGL SERPL-MCNC: 72 MG/DL (ref 0–150)
VLDLC SERPL CALC-MCNC: 14 MG/DL

## 2019-01-29 LAB
ESTIMATED AVERAGE GLUCOSE: 142.7 MG/DL
HBA1C MFR BLD: 6.6 %

## 2019-01-30 ENCOUNTER — OFFICE VISIT (OUTPATIENT)
Dept: FAMILY MEDICINE CLINIC | Age: 44
End: 2019-01-30
Payer: COMMERCIAL

## 2019-01-30 ENCOUNTER — TELEPHONE (OUTPATIENT)
Dept: FAMILY MEDICINE CLINIC | Age: 44
End: 2019-01-30

## 2019-01-30 VITALS
BODY MASS INDEX: 30.53 KG/M2 | HEART RATE: 94 BPM | DIASTOLIC BLOOD PRESSURE: 86 MMHG | TEMPERATURE: 97.8 F | OXYGEN SATURATION: 99 % | WEIGHT: 200.8 LBS | SYSTOLIC BLOOD PRESSURE: 124 MMHG

## 2019-01-30 DIAGNOSIS — K85.90 ACUTE PANCREATITIS, UNSPECIFIED COMPLICATION STATUS, UNSPECIFIED PANCREATITIS TYPE: ICD-10-CM

## 2019-01-30 DIAGNOSIS — R10.13 EPIGASTRIC PAIN: Primary | ICD-10-CM

## 2019-01-30 DIAGNOSIS — R10.13 EPIGASTRIC PAIN: ICD-10-CM

## 2019-01-30 LAB — LIPASE: 42 U/L (ref 13–60)

## 2019-01-30 PROCEDURE — G8484 FLU IMMUNIZE NO ADMIN: HCPCS | Performed by: FAMILY MEDICINE

## 2019-01-30 PROCEDURE — 99214 OFFICE O/P EST MOD 30 MIN: CPT | Performed by: FAMILY MEDICINE

## 2019-01-30 PROCEDURE — 4004F PT TOBACCO SCREEN RCVD TLK: CPT | Performed by: FAMILY MEDICINE

## 2019-01-30 PROCEDURE — G8417 CALC BMI ABV UP PARAM F/U: HCPCS | Performed by: FAMILY MEDICINE

## 2019-01-30 PROCEDURE — G8427 DOCREV CUR MEDS BY ELIG CLIN: HCPCS | Performed by: FAMILY MEDICINE

## 2019-01-30 RX ORDER — ACETAMINOPHEN AND CODEINE PHOSPHATE 300; 30 MG/1; MG/1
1-2 TABLET ORAL EVERY 6 HOURS PRN
Qty: 14 TABLET | Refills: 0 | Status: SHIPPED | OUTPATIENT
Start: 2019-01-30 | End: 2019-02-02

## 2019-02-28 ENCOUNTER — OFFICE VISIT (OUTPATIENT)
Dept: FAMILY MEDICINE CLINIC | Age: 44
End: 2019-02-28
Payer: COMMERCIAL

## 2019-02-28 VITALS
RESPIRATION RATE: 8 BRPM | OXYGEN SATURATION: 99 % | HEART RATE: 87 BPM | DIASTOLIC BLOOD PRESSURE: 84 MMHG | SYSTOLIC BLOOD PRESSURE: 118 MMHG | WEIGHT: 192 LBS | TEMPERATURE: 97.8 F | HEIGHT: 68 IN | BODY MASS INDEX: 29.1 KG/M2

## 2019-02-28 DIAGNOSIS — Z87.19 HX OF PANCREATITIS: ICD-10-CM

## 2019-02-28 DIAGNOSIS — F17.200 TOBACCO DEPENDENCE: ICD-10-CM

## 2019-02-28 DIAGNOSIS — E11.9 TYPE 2 DIABETES, HBA1C GOAL < 7% (HCC): Primary | ICD-10-CM

## 2019-02-28 PROCEDURE — G8417 CALC BMI ABV UP PARAM F/U: HCPCS | Performed by: FAMILY MEDICINE

## 2019-02-28 PROCEDURE — 99214 OFFICE O/P EST MOD 30 MIN: CPT | Performed by: FAMILY MEDICINE

## 2019-02-28 PROCEDURE — 4004F PT TOBACCO SCREEN RCVD TLK: CPT | Performed by: FAMILY MEDICINE

## 2019-02-28 PROCEDURE — 2022F DILAT RTA XM EVC RTNOPTHY: CPT | Performed by: FAMILY MEDICINE

## 2019-02-28 PROCEDURE — 3044F HG A1C LEVEL LT 7.0%: CPT | Performed by: FAMILY MEDICINE

## 2019-02-28 PROCEDURE — G8427 DOCREV CUR MEDS BY ELIG CLIN: HCPCS | Performed by: FAMILY MEDICINE

## 2019-02-28 PROCEDURE — G8484 FLU IMMUNIZE NO ADMIN: HCPCS | Performed by: FAMILY MEDICINE

## 2019-02-28 RX ORDER — VARENICLINE TARTRATE 1 MG/1
1 TABLET, FILM COATED ORAL 2 TIMES DAILY
Qty: 60 TABLET | Refills: 3 | Status: SHIPPED | OUTPATIENT
Start: 2019-02-28 | End: 2019-05-17

## 2019-02-28 RX ORDER — PIOGLITAZONEHYDROCHLORIDE 30 MG/1
30 TABLET ORAL DAILY
Qty: 30 TABLET | Refills: 3 | Status: SHIPPED | OUTPATIENT
Start: 2019-02-28 | End: 2019-06-15 | Stop reason: SDUPTHER

## 2019-02-28 RX ORDER — VARENICLINE TARTRATE 25 MG
KIT ORAL
Qty: 1 EACH | Refills: 0 | Status: CANCELLED | OUTPATIENT
Start: 2019-02-28

## 2019-02-28 ASSESSMENT — PATIENT HEALTH QUESTIONNAIRE - PHQ9
SUM OF ALL RESPONSES TO PHQ QUESTIONS 1-9: 0
SUM OF ALL RESPONSES TO PHQ9 QUESTIONS 1 & 2: 0
SUM OF ALL RESPONSES TO PHQ QUESTIONS 1-9: 0
2. FEELING DOWN, DEPRESSED OR HOPELESS: 0
1. LITTLE INTEREST OR PLEASURE IN DOING THINGS: 0

## 2019-05-03 DIAGNOSIS — Z87.19 HX OF PANCREATITIS: ICD-10-CM

## 2019-05-03 DIAGNOSIS — E11.9 TYPE 2 DIABETES, HBA1C GOAL < 7% (HCC): ICD-10-CM

## 2019-05-03 LAB
A/G RATIO: 2.3 (ref 1.1–2.2)
ALBUMIN SERPL-MCNC: 4.4 G/DL (ref 3.4–5)
ALP BLD-CCNC: 62 U/L (ref 40–129)
ALT SERPL-CCNC: 15 U/L (ref 10–40)
ANION GAP SERPL CALCULATED.3IONS-SCNC: 10 MMOL/L (ref 3–16)
AST SERPL-CCNC: 11 U/L (ref 15–37)
BILIRUB SERPL-MCNC: <0.2 MG/DL (ref 0–1)
BUN BLDV-MCNC: 18 MG/DL (ref 7–20)
CALCIUM SERPL-MCNC: 9.1 MG/DL (ref 8.3–10.6)
CHLORIDE BLD-SCNC: 107 MMOL/L (ref 99–110)
CO2: 25 MMOL/L (ref 21–32)
CREAT SERPL-MCNC: 0.9 MG/DL (ref 0.9–1.3)
CREATININE URINE: 144.7 MG/DL (ref 39–259)
GFR AFRICAN AMERICAN: >60
GFR NON-AFRICAN AMERICAN: >60
GLOBULIN: 1.9 G/DL
GLUCOSE BLD-MCNC: 181 MG/DL (ref 70–99)
LIPASE: 32 U/L (ref 13–60)
MICROALBUMIN UR-MCNC: <1.2 MG/DL
MICROALBUMIN/CREAT UR-RTO: NORMAL MG/G (ref 0–30)
POTASSIUM SERPL-SCNC: 4.6 MMOL/L (ref 3.5–5.1)
SODIUM BLD-SCNC: 142 MMOL/L (ref 136–145)
TOTAL PROTEIN: 6.3 G/DL (ref 6.4–8.2)

## 2019-05-04 DIAGNOSIS — E11.9 TYPE 2 DIABETES MELLITUS WITH HEMOGLOBIN A1C GOAL OF 7.0%-8.0% (HCC): ICD-10-CM

## 2019-05-04 LAB
ESTIMATED AVERAGE GLUCOSE: 142.7 MG/DL
HBA1C MFR BLD: 6.6 %

## 2019-05-06 RX ORDER — LISINOPRIL 5 MG/1
TABLET ORAL
Qty: 90 TABLET | Refills: 0 | Status: SHIPPED | OUTPATIENT
Start: 2019-05-06 | End: 2019-05-17 | Stop reason: ALTCHOICE

## 2019-05-06 RX ORDER — ATORVASTATIN CALCIUM 10 MG/1
10 TABLET, FILM COATED ORAL DAILY
Qty: 90 TABLET | Refills: 0 | Status: SHIPPED | OUTPATIENT
Start: 2019-05-06 | End: 2019-07-28 | Stop reason: SDUPTHER

## 2019-05-17 ENCOUNTER — OFFICE VISIT (OUTPATIENT)
Dept: FAMILY MEDICINE CLINIC | Age: 44
End: 2019-05-17
Payer: COMMERCIAL

## 2019-05-17 VITALS
BODY MASS INDEX: 29.32 KG/M2 | HEART RATE: 88 BPM | WEIGHT: 192.8 LBS | SYSTOLIC BLOOD PRESSURE: 112 MMHG | DIASTOLIC BLOOD PRESSURE: 76 MMHG | OXYGEN SATURATION: 94 % | TEMPERATURE: 98.2 F

## 2019-05-17 DIAGNOSIS — Z72.0 TOBACCO USE: ICD-10-CM

## 2019-05-17 DIAGNOSIS — E11.9 TYPE 2 DIABETES MELLITUS WITH HEMOGLOBIN A1C GOAL OF 7.0%-8.0% (HCC): Primary | ICD-10-CM

## 2019-05-17 PROCEDURE — 4004F PT TOBACCO SCREEN RCVD TLK: CPT | Performed by: FAMILY MEDICINE

## 2019-05-17 PROCEDURE — 99214 OFFICE O/P EST MOD 30 MIN: CPT | Performed by: FAMILY MEDICINE

## 2019-05-17 PROCEDURE — 2022F DILAT RTA XM EVC RTNOPTHY: CPT | Performed by: FAMILY MEDICINE

## 2019-05-17 PROCEDURE — G8427 DOCREV CUR MEDS BY ELIG CLIN: HCPCS | Performed by: FAMILY MEDICINE

## 2019-05-17 PROCEDURE — 3044F HG A1C LEVEL LT 7.0%: CPT | Performed by: FAMILY MEDICINE

## 2019-05-17 PROCEDURE — G8417 CALC BMI ABV UP PARAM F/U: HCPCS | Performed by: FAMILY MEDICINE

## 2019-05-17 RX ORDER — BUPROPION HYDROCHLORIDE 150 MG/1
150 TABLET ORAL EVERY MORNING
Qty: 30 TABLET | Refills: 5 | Status: SHIPPED | OUTPATIENT
Start: 2019-05-17 | End: 2019-10-22 | Stop reason: SDUPTHER

## 2019-05-17 NOTE — PATIENT INSTRUCTIONS
1) Continue the exericse!!!  2) Switch Chantix to once a day Wellbutrin. Call with update in 2 to 4 weeks if this is actually helping you decrease amount of cigarettes/week. 3) Continue your medication. 4) You A1C is below 7%. Good job. Set a goal to improve it even more to an A1C in the low 6s.

## 2019-05-17 NOTE — PROGRESS NOTES
(PRINIVIL;ZESTRIL) 5 MG tablet Take 1 tablet by mouth daily Yes Al Route, DO          Vitals:    05/17/19 0950 05/17/19 0958   BP: (!) 141/100 112/76   Pulse: 88    Temp: 98.2 °F (36.8 °C)    TempSrc: Oral    SpO2: 94%    Weight: 192 lb 12.8 oz (87.5 kg)       Wt Readings from Last 3 Encounters:   05/17/19 192 lb 12.8 oz (87.5 kg)   02/28/19 192 lb (87.1 kg)   01/30/19 200 lb 12.8 oz (91.1 kg)     BP Readings from Last 3 Encounters:   05/17/19 112/76   02/28/19 118/84   01/30/19 124/86       Patient Active Problem List   Diagnosis    Tobacco dependence    Fatty liver    Idiopathic acute pancreatitis    Gastritis without bleeding    Schatzki's ring of distal esophagus         There is no immunization history on file for this patient. No past medical history on file.   Past Surgical History:   Procedure Laterality Date    UPPER GASTROINTESTINAL ENDOSCOPY N/A 10/9/2018    EGD BIOPSY performed by Lorna Ospina MD at [facility] ENDOSCOPY     Family History   Problem Relation Age of Onset    Cancer Father 61    Diabetes Brother     Diabetes Maternal Grandmother     Diabetes Paternal Grandmother      Social History     Socioeconomic History    Marital status:      Spouse name: Not on file    Number of children: Not on file    Years of education: Not on file    Highest education level: Not on file   Occupational History    Not on file   Social Needs    Financial resource strain: Not on file    Food insecurity:     Worry: Not on file     Inability: Not on file    Transportation needs:     Medical: Not on file     Non-medical: Not on file   Tobacco Use    Smoking status: Current Every Day Smoker     Packs/day: 0.25     Years: 30.00     Pack years: 7.50     Types: Cigarettes    Smokeless tobacco: Never Used   Substance and Sexual Activity    Alcohol use: No     Alcohol/week: 0.0 oz    Drug use: No    Sexual activity: Yes     Partners: Female   Lifestyle    Physical activity:     Days per week: Not on file     Minutes per session: Not on file    Stress: Not on file   Relationships    Social connections:     Talks on phone: Not on file     Gets together: Not on file     Attends Jewish service: Not on file     Active member of club or organization: Not on file     Attends meetings of clubs or organizations: Not on file     Relationship status: Not on file    Intimate partner violence:     Fear of current or ex partner: Not on file     Emotionally abused: Not on file     Physically abused: Not on file     Forced sexual activity: Not on file   Other Topics Concern    Not on file   Social History Narrative    Not on file       O: /76   Pulse 88   Temp 98.2 °F (36.8 °C) (Oral)   Wt 192 lb 12.8 oz (87.5 kg)   SpO2 94%   BMI 29.32 kg/m²   Physical Exam  GEN: No acute distress, cooperative, well nourished, alert. HEENT: PEERLA, EOMI , normocephalic/atraumatic, nares and oropharynx clear. Mucus membranes normal, Tympanic membranes clear bilaterally. Neck: soft, supple, no thyromegaly, mass, no Lymphadenopathy  CV: Regular rate and rhythm, no murmur, rubs, gallops. No edema. Resp: Clear to auscultation bilaterally good air entry bilaterally  No crackles, wheeze. Breathing comfortably. Psych: normal affect. Neuro: AOx3      ASSESSMENT   Diagnosis Orders   1. Type 2 diabetes mellitus with hemoglobin A1c goal of 7.0%-8.0% ([facility])  HEMOGLOBIN A1C    COMPREHENSIVE METABOLIC PANEL    LIPID PANEL   2. Tobacco use  buPROPion (WELLBUTRIN XL) 150 MG extended release tablet     The risks, benefits, potential side effects and barriers to medication use were addressed today. Understanding was acknowledged. Patient asked to follow-up if condition(s) do not improve as anticipated. #1: The current medical regimen is effective;  continue present plan and medications. PLAN          See rest of plan under patient instructions.     Return for Diabetes in 3 to 6 months. Patient Instructions   1) Continue the exericse!!!  2) Switch Chantix to once a day Wellbutrin. Call with update in 2 to 4 weeks if this is actually helping you decrease amount of cigarettes/week. 3) Continue your medication. 4) You A1C is below 7%. Good job. Set a goal to improve it even more to an A1C in the low 6s. Please note a portion of this chart was generated using dragon dictation software. Although every effort was made to ensure the accuracy of this automated transcription, some errors in transcription may have occurred.

## 2019-06-17 RX ORDER — PIOGLITAZONEHYDROCHLORIDE 30 MG/1
TABLET ORAL
Qty: 30 TABLET | Refills: 2 | Status: SHIPPED | OUTPATIENT
Start: 2019-06-17 | End: 2019-09-08 | Stop reason: SDUPTHER

## 2019-07-28 DIAGNOSIS — E11.9 TYPE 2 DIABETES MELLITUS WITH HEMOGLOBIN A1C GOAL OF 7.0%-8.0% (HCC): ICD-10-CM

## 2019-07-30 RX ORDER — LISINOPRIL 5 MG/1
TABLET ORAL
Qty: 21 TABLET | Refills: 4 | Status: SHIPPED | OUTPATIENT
Start: 2019-07-30 | End: 2019-11-06 | Stop reason: SDUPTHER

## 2019-07-30 RX ORDER — ATORVASTATIN CALCIUM 10 MG/1
10 TABLET, FILM COATED ORAL DAILY
Qty: 21 TABLET | Refills: 4 | Status: SHIPPED | OUTPATIENT
Start: 2019-07-30 | End: 2019-11-06 | Stop reason: SDUPTHER

## 2019-07-30 NOTE — TELEPHONE ENCOUNTER
Requested Prescriptions     Pending Prescriptions Disp Refills    lisinopril (PRINIVIL;ZESTRIL) 5 MG tablet [Pharmacy Med Name: LISINOPRIL 5 MG TABLET] 21 tablet 4     Sig: TAKE 1 TABLET BY MOUTH EVERY DAY    atorvastatin (LIPITOR) 10 MG tablet [Pharmacy Med Name: ATORVASTATIN 10 MG TABLET] 21 tablet 4     Sig: TAKE 1 TABLET BY MOUTH DAILY       Patient is requesting, refill on the following medications.      Last  Office visit = 05/17/2019     Last Labs = 05/03/2019     Last refill = lisinopril 5mg, 12/03/2018, and lipitor  5/6/2019

## 2019-09-09 RX ORDER — PIOGLITAZONEHYDROCHLORIDE 30 MG/1
TABLET ORAL
Qty: 21 TABLET | Refills: 4 | Status: SHIPPED | OUTPATIENT
Start: 2019-09-09 | End: 2019-12-17 | Stop reason: SDUPTHER

## 2019-09-20 ENCOUNTER — OFFICE VISIT (OUTPATIENT)
Dept: FAMILY MEDICINE CLINIC | Age: 44
End: 2019-09-20
Payer: COMMERCIAL

## 2019-09-20 VITALS
SYSTOLIC BLOOD PRESSURE: 126 MMHG | DIASTOLIC BLOOD PRESSURE: 88 MMHG | OXYGEN SATURATION: 96 % | HEART RATE: 100 BPM | WEIGHT: 195.2 LBS | HEIGHT: 68 IN | TEMPERATURE: 98.7 F | BODY MASS INDEX: 29.58 KG/M2

## 2019-09-20 DIAGNOSIS — G62.89 OTHER POLYNEUROPATHY: Primary | ICD-10-CM

## 2019-09-20 PROCEDURE — G8427 DOCREV CUR MEDS BY ELIG CLIN: HCPCS | Performed by: FAMILY MEDICINE

## 2019-09-20 PROCEDURE — 99214 OFFICE O/P EST MOD 30 MIN: CPT | Performed by: FAMILY MEDICINE

## 2019-09-20 PROCEDURE — 4004F PT TOBACCO SCREEN RCVD TLK: CPT | Performed by: FAMILY MEDICINE

## 2019-09-20 PROCEDURE — G8417 CALC BMI ABV UP PARAM F/U: HCPCS | Performed by: FAMILY MEDICINE

## 2019-09-20 SDOH — HEALTH STABILITY: MENTAL HEALTH: HOW MANY STANDARD DRINKS CONTAINING ALCOHOL DO YOU HAVE ON A TYPICAL DAY?: 1 OR 2

## 2019-09-20 SDOH — HEALTH STABILITY: MENTAL HEALTH: HOW OFTEN DO YOU HAVE A DRINK CONTAINING ALCOHOL?: 2-4 TIMES A MONTH

## 2019-09-20 NOTE — PROGRESS NOTES
DO Stas          Vitals:    09/20/19 1219   BP: 126/88   Site: Left Upper Arm   Position: Sitting   Cuff Size: Medium Adult   Pulse: 100   Temp: 98.7 °F (37.1 °C)   TempSrc: Oral   SpO2: 96%   Weight: 195 lb 3.2 oz (88.5 kg)   Height: 5' 8\" (1.727 m)      Wt Readings from Last 3 Encounters:   09/20/19 195 lb 3.2 oz (88.5 kg)   05/17/19 192 lb 12.8 oz (87.5 kg)   02/28/19 192 lb (87.1 kg)     BP Readings from Last 3 Encounters:   09/20/19 126/88   05/17/19 112/76   02/28/19 118/84       Patient Active Problem List   Diagnosis    Tobacco dependence    Fatty liver    Idiopathic acute pancreatitis    Gastritis without bleeding    Schatzki's ring of distal esophagus         There is no immunization history on file for this patient. No past medical history on file. Past Surgical History:   Procedure Laterality Date    UPPER GASTROINTESTINAL ENDOSCOPY N/A 10/9/2018    EGD BIOPSY performed by Marissa [facility]herty MD at [facility] ENDOSCOPY     Family History   Problem Relation Age of Onset    Cancer Father 61    Diabetes Brother     Diabetes Maternal Grandmother     Diabetes Paternal Grandmother      Social History     Socioeconomic History    Marital status:      Spouse name: Not on file    Number of children: Not on file    Years of education: Not on file    Highest education level: Not on file   Occupational History    Not on file   Social Needs    Financial resource strain: Not on file    Food insecurity:     Worry: Not on file     Inability: Not on file    Transportation needs:     Medical: Not on file     Non-medical: Not on file   Tobacco Use    Smoking status: Current Every Day Smoker     Packs/day: 1.00     Years: 30.00     Pack years: 30.00     Types: Cigarettes     Start date: 1/1/1988    Smokeless tobacco: Never Used   Substance and Sexual Activity    Alcohol use:  Yes     Alcohol/week: 2.0 standard drinks     Types: 2 Standard drinks or equivalent per week     Frequency: 2-4 thyroid disease, and kidney problems. How is it treated? Treatment for peripheral neuropathy can relieve symptoms. This is done by treating the health problem that's causing it. For example, if you have diabetes, keeping your blood sugar within your target range may help. Or maybe your body lacks certain vitamins caused by drinking too much alcohol. In that case, treatment may include eating a healthy diet, taking vitamins, and stopping alcohol use. You may have physical therapy. This can increase muscle strength and help build muscle control. Over-the-counter medicine can relieve mild nerve pain. Your doctor may also prescribe medicine to help with severe pain, numbness, tingling, and weakness. If you have neuropathy in your feet, it's a good idea to have them checked during each office visit. This can help prevent problems. Some people find that physical therapy, acupuncture, or transcutaneous electrical nerve stimulation (TENS) helps relieve pain. Follow-up care is a key part of your treatment and safety. Be sure to make and go to all appointments, and call your doctor if you are having problems. It's also a good idea to know your test results and keep a list of the medicines you take. Where can you learn more? Go to https://InnoPadpepicewCampus Sponsorship.Risk Management Solution. org and sign in to your Secured Mail account. Enter P130 in the Grandis box to learn more about \"Learning About Peripheral Neuropathy. \"     If you do not have an account, please click on the \"Sign Up Now\" link. Current as of: July 25, 2018  Content Version: 12.1  © 5524-8821 Healthwise, Incorporated. Care instructions adapted under license by [facility] ([facility]). If you have questions about a medical condition or this instruction, always ask your healthcare professional. [place] [address] any warranty or liability for your use of this information. Flory  [address], [address] [address] Double JULAI Gulliver, [address]  Phone: [phone] Fax: [phone]  Mon.-Fri. 7 a.m.-5 p.m. Sat. 8 a.m.-Noon    [facility]  [address] [place] [address], [facility] 70  Phone: [phone]  Mon.-Fri. 7:30 a.m.-4 p.m. [address], [facility] 70  Phone: [phone]    Fax: [phone]  Monday-Friday 8:00 a.m.- 4:30 p.m. 1418 [facility]  Pending sale to [facility], [address] [place] [address], [address] [place] [address] [address]  Phone: [phone]  Mon.-Fri. 8 a.m.-8 p.m. Sat. 9 a.m.-5 p.m. 6900 [facility]  [address]  [facility]. [address]  Phone: [phone] Fax: [phone]  Mon.-Fri. 8 a.m.-5 p.m. [facility]  [address] [place] [address]  Phone: [phone] Fax: [phone]  Mon.-Fri. 7:30 a.m.-4 p.m. [facility]  555 [facility], [address]  Phone: [phone]  Mon., Tue., Thu., Fri. 7:30 a.m.-5 p.m.  Margarita Ledezma. 7:30 a.m.-Noon    [facility]  [address]  Phone: [phone] Fax: [phone]  Mon.-Fri. 7:30 a.m.-4 p.m. [facility]  [address]  Phone: [phone] Fax: [phone]  Mon.-Fri. 8 a.m.-4:30 p.m. 506 [facility] and [facility] Services  [address], 914 [facility], 2550 [facility]  Phone: [phone] Fax: [phone]  Mon.-Fri. 8 a.m.-4:30 p.m. 1315 [facility]  [address] [place] [address], [address]  Phone: [phone] Fax: [phone]  Mon.-Fri. 7 a.m.-5 p.m. Sat. 8 a.m.-4 p.m. [address]  Phone: [phone]  Mon.-Fri. 7:30 a.m.-4 p.m. [address] [facility] [address]  1139 [facility]  Phone: [phone] Fax: [phone]  Mon.-Fri. 7 a.m.-5 p.m.   Sat. 8

## 2019-09-20 NOTE — PATIENT INSTRUCTIONS
[phone]  Mon.-Fri. 8 a.m.-5 p.m. [facility]  1315 [facility]  Phone: [phone] Fax: [phone]  Mon.-Fri. 7:30 a.m.-4 p.m. [facility]  555 [facility], [address] [place] [address], [address]  Phone: [phone]  Mon., Tue., Thu., Fri. 7:30 a.m.-5 p.m.  Zoya Sudhirskinny. 7:30 a.m.[facility]  [address]  [place], [address] [place] [address]  Phone: [phone] Fax: [phone]  Mon.-Fri. 7:30 a.m.-4 p.m. [facility]  [address], jasbirAshe Memorial Hospitalamelia 78  Phone: [phone] Fax: [phone]  Mon.-Fri. 8 a.m.-4:30 p.m. 506 [facility] and Lab Services  [facility] 189, 914 [facility], [facility]  Phone: [phone] Fax: [phone]  Mon.-Fri. 8 a.m.-4:30 p.m. 1315 [facility]  102 [facility] [address]  Phone: [phone] Fax: [phone]  Mon.-Fri. 7 a.m.-5 p.m. Sat. 8 a.m.-4 p.m. [address]  Phone: [phone]  Mon.-Fri. 7:30 a.m.-4 p.m. [address] [facility] [address]  1139 [facility]  Phone: [phone] Fax: [phone]  Mon.-Fri. 7 a.m.-5 p.m. Sat. 8 a.m.-Noon [facility] [place], 189 E [facility] 429  Phone: [phone] Fax: [phone]  Mon.-Fri. 7 a.m.-5 p.m. [facility]  315 [facility], 400 [facility]  Phone: [phone] Fax: [phone]  Mon.-Fri. 7 a.m.-5 p.m. [address], [place], [address] [place] [address]  Phone: [phone]  Mon.-Fri. 6:30 a.m.-6 p.m. Sat. 7 a.m.-1 p.m. [place] [address], [place], [facility]  Phone: [phone]  [facility] 24/7    [address], [address]  Phone: [phone]  Mon.-Fri. 6 a.m.-7 p.m.   Sat. 7 a.m.-3

## 2019-09-27 DIAGNOSIS — G62.89 OTHER POLYNEUROPATHY: ICD-10-CM

## 2019-09-27 DIAGNOSIS — E11.9 TYPE 2 DIABETES MELLITUS WITH HEMOGLOBIN A1C GOAL OF 7.0%-8.0% (HCC): ICD-10-CM

## 2019-09-27 LAB
A/G RATIO: 2.9 (ref 1.1–2.2)
ALBUMIN SERPL-MCNC: 4.6 G/DL (ref 3.4–5)
ALP BLD-CCNC: 58 U/L (ref 40–129)
ALT SERPL-CCNC: 17 U/L (ref 10–40)
ANION GAP SERPL CALCULATED.3IONS-SCNC: 14 MMOL/L (ref 3–16)
AST SERPL-CCNC: 15 U/L (ref 15–37)
BILIRUB SERPL-MCNC: 0.3 MG/DL (ref 0–1)
BUN BLDV-MCNC: 21 MG/DL (ref 7–20)
CALCIUM SERPL-MCNC: 9.4 MG/DL (ref 8.3–10.6)
CHLORIDE BLD-SCNC: 105 MMOL/L (ref 99–110)
CO2: 25 MMOL/L (ref 21–32)
CREAT SERPL-MCNC: 0.9 MG/DL (ref 0.9–1.3)
GFR AFRICAN AMERICAN: >60
GFR NON-AFRICAN AMERICAN: >60
GLOBULIN: 1.6 G/DL
GLUCOSE BLD-MCNC: 123 MG/DL (ref 70–99)
IRON: 131 UG/DL (ref 59–158)
POTASSIUM SERPL-SCNC: 4.9 MMOL/L (ref 3.5–5.1)
SODIUM BLD-SCNC: 144 MMOL/L (ref 136–145)
TOTAL PROTEIN: 6.2 G/DL (ref 6.4–8.2)
TSH REFLEX: 1.06 UIU/ML (ref 0.27–4.2)

## 2019-09-28 LAB
ESTIMATED AVERAGE GLUCOSE: 125.5 MG/DL
HBA1C MFR BLD: 6 %

## 2019-10-22 DIAGNOSIS — Z72.0 TOBACCO USE: ICD-10-CM

## 2019-10-22 RX ORDER — BUPROPION HYDROCHLORIDE 150 MG/1
TABLET ORAL
Qty: 21 TABLET | Refills: 8 | Status: SHIPPED | OUTPATIENT
Start: 2019-10-22 | End: 2020-05-07

## 2019-11-06 DIAGNOSIS — E11.9 TYPE 2 DIABETES MELLITUS WITH HEMOGLOBIN A1C GOAL OF 7.0%-8.0% (HCC): ICD-10-CM

## 2019-11-11 RX ORDER — LISINOPRIL 5 MG/1
TABLET ORAL
Qty: 21 TABLET | Refills: 4 | Status: SHIPPED | OUTPATIENT
Start: 2019-11-11 | End: 2020-02-28

## 2019-11-11 RX ORDER — ATORVASTATIN CALCIUM 10 MG/1
10 TABLET, FILM COATED ORAL DAILY
Qty: 21 TABLET | Refills: 4 | Status: SHIPPED | OUTPATIENT
Start: 2019-11-11 | End: 2020-02-28

## 2019-11-27 ENCOUNTER — TELEPHONE (OUTPATIENT)
Dept: GASTROENTEROLOGY | Age: 44
End: 2019-11-27

## 2019-11-27 ENCOUNTER — OFFICE VISIT (OUTPATIENT)
Dept: FAMILY MEDICINE CLINIC | Age: 44
End: 2019-11-27
Payer: COMMERCIAL

## 2019-11-27 ENCOUNTER — TELEPHONE (OUTPATIENT)
Dept: FAMILY MEDICINE CLINIC | Age: 44
End: 2019-11-27

## 2019-11-27 VITALS
WEIGHT: 200 LBS | TEMPERATURE: 97.8 F | HEIGHT: 68 IN | HEART RATE: 95 BPM | BODY MASS INDEX: 30.31 KG/M2 | DIASTOLIC BLOOD PRESSURE: 82 MMHG | SYSTOLIC BLOOD PRESSURE: 114 MMHG | OXYGEN SATURATION: 95 % | RESPIRATION RATE: 14 BRPM

## 2019-11-27 DIAGNOSIS — F17.200 TOBACCO DEPENDENCE: ICD-10-CM

## 2019-11-27 DIAGNOSIS — E11.9 TYPE 2 DIABETES MELLITUS WITH HEMOGLOBIN A1C GOAL OF 7.0%-8.0% (HCC): Primary | ICD-10-CM

## 2019-11-27 DIAGNOSIS — K76.0 FATTY LIVER: ICD-10-CM

## 2019-11-27 PROCEDURE — G8417 CALC BMI ABV UP PARAM F/U: HCPCS | Performed by: FAMILY MEDICINE

## 2019-11-27 PROCEDURE — 3044F HG A1C LEVEL LT 7.0%: CPT | Performed by: FAMILY MEDICINE

## 2019-11-27 PROCEDURE — G8484 FLU IMMUNIZE NO ADMIN: HCPCS | Performed by: FAMILY MEDICINE

## 2019-11-27 PROCEDURE — 99214 OFFICE O/P EST MOD 30 MIN: CPT | Performed by: FAMILY MEDICINE

## 2019-11-27 PROCEDURE — 4004F PT TOBACCO SCREEN RCVD TLK: CPT | Performed by: FAMILY MEDICINE

## 2019-11-27 PROCEDURE — G8427 DOCREV CUR MEDS BY ELIG CLIN: HCPCS | Performed by: FAMILY MEDICINE

## 2019-11-27 PROCEDURE — 2022F DILAT RTA XM EVC RTNOPTHY: CPT | Performed by: FAMILY MEDICINE

## 2019-12-02 ENCOUNTER — TELEPHONE (OUTPATIENT)
Dept: FAMILY MEDICINE CLINIC | Age: 44
End: 2019-12-02

## 2019-12-18 RX ORDER — PIOGLITAZONEHYDROCHLORIDE 30 MG/1
TABLET ORAL
Qty: 21 TABLET | Refills: 2 | Status: SHIPPED | OUTPATIENT
Start: 2019-12-18 | End: 2020-02-10

## 2020-02-10 RX ORDER — PIOGLITAZONEHYDROCHLORIDE 30 MG/1
TABLET ORAL
Qty: 21 TABLET | Refills: 5 | Status: SHIPPED | OUTPATIENT
Start: 2020-02-10 | End: 2020-06-17

## 2020-02-28 RX ORDER — ATORVASTATIN CALCIUM 10 MG/1
10 TABLET, FILM COATED ORAL DAILY
Qty: 30 TABLET | Refills: 5 | Status: SHIPPED | OUTPATIENT
Start: 2020-02-28 | End: 2020-08-14

## 2020-02-28 RX ORDER — LISINOPRIL 5 MG/1
TABLET ORAL
Qty: 30 TABLET | Refills: 5 | Status: SHIPPED | OUTPATIENT
Start: 2020-02-28 | End: 2020-08-14

## 2020-04-09 DIAGNOSIS — E11.9 TYPE 2 DIABETES MELLITUS WITH HEMOGLOBIN A1C GOAL OF 7.0%-8.0% (HCC): ICD-10-CM

## 2020-04-09 LAB
A/G RATIO: 2.4 (ref 1.1–2.2)
ALBUMIN SERPL-MCNC: 4.6 G/DL (ref 3.4–5)
ALP BLD-CCNC: 58 U/L (ref 40–129)
ALT SERPL-CCNC: 20 U/L (ref 10–40)
ANION GAP SERPL CALCULATED.3IONS-SCNC: 12 MMOL/L (ref 3–16)
AST SERPL-CCNC: 15 U/L (ref 15–37)
BILIRUB SERPL-MCNC: 0.3 MG/DL (ref 0–1)
BUN BLDV-MCNC: 21 MG/DL (ref 7–20)
CALCIUM SERPL-MCNC: 9.6 MG/DL (ref 8.3–10.6)
CHLORIDE BLD-SCNC: 104 MMOL/L (ref 99–110)
CHOLESTEROL, TOTAL: 153 MG/DL (ref 0–199)
CO2: 25 MMOL/L (ref 21–32)
CREAT SERPL-MCNC: 0.9 MG/DL (ref 0.9–1.3)
CREATININE URINE: 94.2 MG/DL (ref 39–259)
GFR AFRICAN AMERICAN: >60
GFR NON-AFRICAN AMERICAN: >60
GLOBULIN: 1.9 G/DL
GLUCOSE BLD-MCNC: 116 MG/DL (ref 70–99)
HDLC SERPL-MCNC: 34 MG/DL (ref 40–60)
LDL CHOLESTEROL CALCULATED: 94 MG/DL
MICROALBUMIN UR-MCNC: <1.2 MG/DL
MICROALBUMIN/CREAT UR-RTO: NORMAL MG/G (ref 0–30)
POTASSIUM SERPL-SCNC: 4.8 MMOL/L (ref 3.5–5.1)
SODIUM BLD-SCNC: 141 MMOL/L (ref 136–145)
TOTAL PROTEIN: 6.5 G/DL (ref 6.4–8.2)
TRIGL SERPL-MCNC: 123 MG/DL (ref 0–150)
VLDLC SERPL CALC-MCNC: 25 MG/DL

## 2020-04-10 LAB
ESTIMATED AVERAGE GLUCOSE: 134.1 MG/DL
HBA1C MFR BLD: 6.3 %

## 2020-05-07 RX ORDER — BUPROPION HYDROCHLORIDE 150 MG/1
TABLET ORAL
Qty: 21 TABLET | Refills: 8 | Status: SHIPPED | OUTPATIENT
Start: 2020-05-07 | End: 2020-08-25 | Stop reason: SDUPTHER

## 2020-07-06 ENCOUNTER — TELEPHONE (OUTPATIENT)
Dept: FAMILY MEDICINE CLINIC | Age: 45
End: 2020-07-06

## 2020-07-06 NOTE — TELEPHONE ENCOUNTER
[facility] received a call from:    Name of Caller: Elliot Keys    Relationship to patient: self    Organization name: N/A    Best contact number: [phone]    Reason for call: Pt states that he needs an in office appointment.  Please advise

## 2020-08-14 RX ORDER — LISINOPRIL 5 MG/1
TABLET ORAL
Qty: 21 TABLET | Refills: 8 | Status: SHIPPED | OUTPATIENT
Start: 2020-08-14 | End: 2020-08-25

## 2020-08-14 RX ORDER — PIOGLITAZONEHYDROCHLORIDE 30 MG/1
TABLET ORAL
Qty: 21 TABLET | Refills: 2 | Status: SHIPPED | OUTPATIENT
Start: 2020-08-14 | End: 2020-08-25 | Stop reason: SDUPTHER

## 2020-08-14 RX ORDER — ATORVASTATIN CALCIUM 10 MG/1
TABLET, FILM COATED ORAL
Qty: 21 TABLET | Refills: 8 | Status: SHIPPED | OUTPATIENT
Start: 2020-08-14 | End: 2020-08-25 | Stop reason: SDUPTHER

## 2020-08-25 ENCOUNTER — OFFICE VISIT (OUTPATIENT)
Dept: FAMILY MEDICINE CLINIC | Age: 45
End: 2020-08-25
Payer: COMMERCIAL

## 2020-08-25 VITALS
RESPIRATION RATE: 16 BRPM | HEART RATE: 105 BPM | TEMPERATURE: 97.7 F | WEIGHT: 210 LBS | BODY MASS INDEX: 31.83 KG/M2 | SYSTOLIC BLOOD PRESSURE: 138 MMHG | HEIGHT: 68 IN | OXYGEN SATURATION: 96 % | DIASTOLIC BLOOD PRESSURE: 90 MMHG

## 2020-08-25 PROCEDURE — G8417 CALC BMI ABV UP PARAM F/U: HCPCS | Performed by: FAMILY MEDICINE

## 2020-08-25 PROCEDURE — 99213 OFFICE O/P EST LOW 20 MIN: CPT | Performed by: FAMILY MEDICINE

## 2020-08-25 PROCEDURE — 2022F DILAT RTA XM EVC RTNOPTHY: CPT | Performed by: FAMILY MEDICINE

## 2020-08-25 PROCEDURE — 4004F PT TOBACCO SCREEN RCVD TLK: CPT | Performed by: FAMILY MEDICINE

## 2020-08-25 PROCEDURE — 3044F HG A1C LEVEL LT 7.0%: CPT | Performed by: FAMILY MEDICINE

## 2020-08-25 PROCEDURE — G8427 DOCREV CUR MEDS BY ELIG CLIN: HCPCS | Performed by: FAMILY MEDICINE

## 2020-08-25 RX ORDER — ATORVASTATIN CALCIUM 10 MG/1
TABLET, FILM COATED ORAL
Qty: 21 TABLET | Refills: 11 | Status: SHIPPED | OUTPATIENT
Start: 2020-08-25 | End: 2021-09-07

## 2020-08-25 RX ORDER — LISINOPRIL 10 MG/1
10 TABLET ORAL DAILY
Qty: 21 TABLET | Refills: 11 | Status: SHIPPED | OUTPATIENT
Start: 2020-08-25 | End: 2021-06-02

## 2020-08-25 RX ORDER — PIOGLITAZONEHYDROCHLORIDE 30 MG/1
TABLET ORAL
Qty: 21 TABLET | Refills: 11 | Status: SHIPPED | OUTPATIENT
Start: 2020-08-25 | End: 2021-09-07

## 2020-08-25 RX ORDER — BUPROPION HYDROCHLORIDE 150 MG/1
TABLET ORAL
Qty: 21 TABLET | Refills: 11 | Status: SHIPPED | OUTPATIENT
Start: 2020-08-25 | End: 2021-09-07

## 2020-08-25 ASSESSMENT — PATIENT HEALTH QUESTIONNAIRE - PHQ9
SUM OF ALL RESPONSES TO PHQ QUESTIONS 1-9: 0
SUM OF ALL RESPONSES TO PHQ QUESTIONS 1-9: 0
2. FEELING DOWN, DEPRESSED OR HOPELESS: 0
SUM OF ALL RESPONSES TO PHQ9 QUESTIONS 1 & 2: 0
1. LITTLE INTEREST OR PLEASURE IN DOING THINGS: 0

## 2020-08-25 NOTE — PROGRESS NOTES
[facility]  Progress Note  Hawa Balderas DO Deborah Blackwell  1975 09/07/20    Chief Complaint:   Deborah Blackwell is a 54 y.o. male who is here for DM        HPI:   DM  Diabetes Mellitus Type II:  Home blood sugar records: patient does not test.  No significant episodes of hypoglycemia. No polyuria, polydipsia, visual changes, foot problems, GI upset. Diabetic diet compliance:  compliant most of the time,  Weight trend: stable. Current exercise: no regular exercise. Eye exam current (within one year): unknown. ROS negative for headache, visionchanges, chest pain, shortness of breath, abdominal pain, urinary sx, bowel changes. Past medical, surgical, and social history reviewed. and allergies reviewed. Allergies   Allergen Reactions    Sulfa Antibiotics Itching and Other (See Comments)     Skin turns red     Prior to Visit Medications    Medication Sig Taking? Authorizing Provider   lisinopril (PRINIVIL;ZESTRIL) 10 MG tablet Take 1 tablet by mouth daily Yes Mora Degree Bingcang, DO   buPROPion (WELLBUTRIN XL) 150 MG extended release tablet TAKE 1 TABLET BY MOUTH EVERY DAY IN THE MORNING Yes Mora Degree Bingcang, DO   pioglitazone (ACTOS) 30 MG tablet TAKE 1 TABLET BY MOUTH EVERY DAY Yes Mora Degree Bingcang, DO   atorvastatin (LIPITOR) 10 MG tablet TAKE 1 TABLET BY MOUTH EVERY DAY Yes Mora Degree Bingcang, DO   Blood Glucose Monitoring Suppl (CVS BLOOD GLUCOSE METER) w/Device KIT 1 each by Does not apply route 4 times daily (before meals and nightly)  Patient not taking: Reported on 8/25/2020  Mora Degree Yoanang, DO   blood glucose test strips (ASCENSIA AUTODISC VI;ONE TOUCH ULTRA TEST VI) strip 1 each by In Vitro route 4 times daily (before meals and nightly) As needed.   Patient not taking: Reported on 8/25/2020  Mora Degree Jose Mariagcang, DO   CVS Lancets Ultra Thin MISC 1 each by Does not apply route 4 times daily (before meals and nightly)  Patient not taking: Reported on 8/25/2020  Alok Mcclelland DO          Vitals:    08/25/20 0959 08/25/20 1014 08/25/20 1051   BP: (!) 152/108 (!) 148/108 (!) 138/90   Pulse: 105     Resp: 16     Temp: 97.7 °F (36.5 °C)     TempSrc: Temporal     SpO2: 96%     Weight: 210 lb (95.3 kg)     Height: 5' 7.5\" (1.715 m)        Wt Readings from Last 3 Encounters:   08/25/20 210 lb (95.3 kg)   11/27/19 200 lb (90.7 kg)   09/20/19 195 lb 3.2 oz (88.5 kg)     BP Readings from Last 3 Encounters:   08/25/20 (!) 138/90   11/27/19 114/82   09/20/19 126/88       Patient Active Problem List   Diagnosis    Tobacco dependence    Fatty liver    Idiopathic acute pancreatitis    Gastritis without bleeding    Schatzki's ring of distal esophagus         There is no immunization history on file for this patient. No past medical history on file. Past Surgical History:   Procedure Laterality Date    UPPER GASTROINTESTINAL ENDOSCOPY N/A 10/9/2018    EGD [facility]SY [facility] Thor Nichols MD [facility]     Family History   Problem Relation Age of Onset    Cancer Father 61    Diabetes Brother     Diabetes Maternal Grandmother     Diabetes Paternal Grandmother      Social History     Socioeconomic History    Marital status:      Spouse name: Not on file    Number of children: Not on file    Years of education: Not on file    Highest education level: Not on file   Occupational History    Not on file   Social Needs    Financial resource strain: Not on file    Food insecurity     Worry: Not on file     Inability: Not on file    Transportation needs     Medical: Not on file     Non-medical: Not on file   Tobacco Use    Smoking status: Current Every Day Smoker     Packs/day: 1.00     Years: 30.00     Pack years: 30.00     Types: Cigarettes     Start date: 1/1/1988    Smokeless tobacco: Never Used   Substance and Sexual Activity    Alcohol use:  Yes     Alcohol/week: 2.0 standard drinks     Types: 2 Standard drinks or equivalent per week     Frequency: 2-4 times a month     Drinks per session: 1 or 2     Binge frequency: Never    Drug use: No    Sexual activity: Yes     Partners: Female   Lifestyle    Physical activity     Days per week: Not on file     Minutes per session: Not on file    Stress: Not on file   Relationships    Social connections     Talks on phone: Not on file     Gets together: Not on file     Attends Jehovah's witness service: Not on file     Active member of club or organization: Not on file     Attends meetings of clubs or organizations: Not on file     Relationship status: Not on file    Intimate partner violence     Fear of current or ex partner: Not on file     Emotionally abused: Not on file     Physically abused: Not on file     Forced sexual activity: Not on file   Other Topics Concern    Not on file   Social History Narrative    Not on file       O: BP (!) 138/90   Pulse 105   Temp 97.7 °F (36.5 °C) (Temporal)   Resp 16   Ht 5' 7.5\" (1.715 m)   Wt 210 lb (95.3 kg)   SpO2 96%   BMI 32.41 kg/m²   Physical Exam  GEN: No acute distress,cooperative, well nourished, alert. HEENT: PEERLA, EOMI , normocephalic/atraumatic, nares and oropharynx clear. Mucus membranes normal, Tympanic membranes clear bilaterally. Neck: soft, supple, no thyromegaly,mass, no Lymphadenopathy  CV: Regular rate and rhythm, no murmur, rubs, gallops. No edema. Resp: Clear to auscultation bilaterally good air entry bilaterally  No crackles, wheeze. Breathing comfortably. Psych:normal affect. Neuro: AOx3  Other Pertinent Physical Exam findings: na    Lab Results   Component Value Date    LABA1C 6.3 04/09/2020     Lab Results   Component Value Date    .1 04/09/2020         ASSESSMENT   Diagnosis Orders   1. Type 2 diabetes mellitus with hemoglobin A1c goal of 7.0%-8.0% ([facility])  Hemoglobin A1C    COMPREHENSIVE METABOLIC PANEL    lisinopril (PRINIVIL;ZESTRIL) 10 MG tablet    atorvastatin (LIPITOR) 10 MG tablet   2. Tobacco use  buPROPion (WELLBUTRIN XL) 150 MG extended release tablet     #1: due for labs. #2: Encouraged cessation. Discussed with patient treatment options, and programs to help pt quit. Pt verbalizes understanding, aware of risks of persistent tobacco usage. The risks, benefits, potential side effects and barriers to medication use were addressed today. Understanding was acknowledged. Patient asked to follow-up if condition(s) do not improve as anticipated. PLAN          If applicable, see additional patient information and instructions under \"Patient Instructions. \"    No follow-ups on file. Patient Instructions   NO APPOINTMENT NECESSARY  WE ACCEPT ALL MAJOR INSURANCE PLANS  WE ACCEPT SELF PAYING PATIENTS  CALL [phone] FOR MORE INFORMATION    [facility]  [facility] [address]  Phone: [phone] Fax: [phone]  Mon.-Fri. 7 a.m.-5 p.m. Sat. 8 a.m.[facility]  [address] Kal Blount [address] [place] [address] [facility]  Phone: [phone] and [phone]  Fax: [phone]  Mon.-Fri. 7:30 a.m.-4 p.m. 9000 W [facility] 70  Phone: [phone]  Mon.-Fri. 7:30 a.m.-4 p.m. [facility] Services [address] [place] [address]. [address]  [place], 6500 [facility] [address]  Phone [phone]  Fax: [phone]  Mon-Fri 8 a.m.-5:30 p.m. 723 [facility]  1736 [facility], 1171 [facility]  Phone: [phone]  Fax [phone]  Mon-Fri 7:30 a.m - 4 p.m. [address], [address], [address]  Phone: [phone]  Mon., Tue., Thu., Fri. 7:30 a.m.-5 p.m.  Merced Deleon. 7:30 a.m.[facility]  [address]  [place], [address] [place], [place] [address]ro   Phone: [phone] Fax: [phone]  Mon.-Fri. 7:30 a.m.-4 p.m.     [address] [place] [address] and Lab Services  [facility] [address], 914 [facility], 2550 [facility]  Phone: [phone] Fax: [phone]  Mon.-Fri. 8 a.m.-4:30 p.m. 800 [facility], [address]  Phone: [phone]  Mon.-Fri. 7:30 a.m.-4 p.m. 3364 [facility]  1139 [facility]  Phone: [phone] Fax: [phone]  Mon.-Fri. 7 a.m.-5 p.m. Sat. 8 a.m.-Noon [facility] [place], [place], [address]  Phone: [phone] Fax: [phone]  Mon.-Fri. 7 a.m.-5 p.m. [facility]  315 [facility], 400 [facility]  Phone: [phone] Fax: [phone]  Mon.-Fri. 7 a.m.-5 p.m. [address], [place], [facility]  Phone: [phone]  Mon.-Fri. 6:30 a.m.-6 p.m. Sat. 7 a.m.-1 p.m. [place] [address], [place], [facility]  Phone: [phone]  [facility] 24/7    [facility] [address], 800 [facility]  Phone: [phone]  Mon.-Fri. 6 a.m.-7 p.m. Sat. 7 a.m.-3 p.m. THE [facility]  4600 W [facility]  Phone: [phone]  Mon.-Fri. 6 a.m.-11 p.m.  Sat.-Sun. 6:30 a.m.-11 p.m. [address] and Magnus Edwards  [address]  Phone: [phone]  Mon.-Fri. 8 a.m.-4 p.m. [address] [place] [address] [address]  Phone: [phone]  [facility] 24/7    [facility]  [address], Timothy Sevilla [place] [address]  Phone: [phone]  Mon.-Fri. 6:30 a.m.-6:30 p.m. Sat. 8 a.m.-Noon    1301 [facility] Vida Dominguez [facility]  Phone: [phone] Fax: [phone]  Mon.-Fri. 8:30 a.m.-5 p.m. [facility]   [place] [address]  Phone: [phone] Fax: [phone]  Mon.-Fri. 8 a.m.-4:30 p.m.     [employer] 6401 [facility], Abram Henriquez  Phone: [phone]  Fax: [phone]  Mon-Fri 7:30 am 4 p.m. Please call ahead to check hours. Please note a portion of this chart was generated using dragon dictation software. Although every effort was made to ensure the accuracy of this automated transcription,some errors in transcription may have occurred.

## 2020-08-25 NOTE — PATIENT INSTRUCTIONS
NO APPOINTMENT NECESSARY  WE ACCEPT ALL MAJOR INSURANCE PLANS  WE ACCEPT SELF PAYING PATIENTS  CALL [phone] FOR MORE INFORMATION    [facility] Services  [facility] [address] [place] [address], 136 [facility], 400 [facility]  Phone: [phone] Fax: [phone]  Mon.-Fri. 7 a.m.-5 p.m. Sat. 8 a.m.-Noon    [facility] FOR [facility]GN[facility]IVE DISORDERS  [address] [facility]  Videm baljeet PtrudolphrubensTravis Holly  Phone: [phone] and [phone]  Fax: [phone]  Mon.-Fri. 7:30 a.m.-4 p.m. [address] [place], Raffikody Dusty 70  Phone: [phone]  Mon.-Fri. 7:30 a.m.-4 p.m. [facility] Services [address] [place] [address] Eric [address]  [place], [address] [place] [address] [address]  Phone [phone]  Fax: [phone]  Mon-Fri 8 a.m.-5:30 p.m. 723 [facility] Lab Services  1736 [facility], 1171 [facility]  Phone: [phone]  Fax [phone]  Mon-Fri 7:30 a.m - 4 p.m. [facility]  555 [facility], [address]  Phone: [phone]  Mon., Tue., Thu., Fri. 7:30 a.m.-5 p.m.  Merced Flakes. 7:30 a.m.-Noon    [facility]  200 [facility], 1501 [facility]  Phone: [phone] Fax: [phone]  Mon.-Fri. 7:30 a.m.-4 p.m. 506 [facility] and Lab Services  [address], 914 [facility], 4140 [facility]  Phone: [phone] Fax: [phone]  Mon.-Fri. 8 a.m.-4:30 p.m. 800 [facility], [facility]  Phone: [phone]  Mon.-Fri. 7:30 a.m.-4 p.m. 3364 [facility]  1139 [facility]  Phone: [phone] Fax: [phone]  Mon.-Fri. 7 a.m.-5 p.m. Sat. 8 a.m.-Noon [facility] [place], [address] [place] [address]  Phone: [phone] Fax: [phone]  Mon.-Fri. 7 a.m.-5 p.m.     [facility]  [address] [facility] [address], 400 [facility]  Phone: [phone] Fax: [phone]  Mon.-Fri. 7 a.m.-5 p.m. 216 Baoku  [address] [place] [address]  Phone: [phone]  Mon.-Fri. 6:30 a.m.-6 p.m. Sat. 7 a.m.-1 p.m. [facility], [place], [facility]  Phone: [phone]  Open 24/7    [address], 800 [facility]  Phone: [phone]  Mon.-Fri. 6 a.m.-7 p.m. Sat. 7 a.m.-3 p.m. THE [facility]  46[address] [facility]  Phone: [phone]  Mon.-Fri. 6 a.m.-11 p.m.  Sat.-Sun. 6:30 a.m.-11 p.m. Cira Enciso and Magnus Edwards  [address] [place] [address]  Phone: [phone]  Mon.-Fri. 8 a.m.-4 p.m. [address] [place] [address]. [address]  Phone: [phone]  Open 24/7    [facility] 7045, [place] [address]  Phone: [phone]  Mon.-Fri. 6:30 a.m.-6:30 p.m. Sat. 8 a.m.-Noon    1301 [facility] Nilorosaeverettcolilliana [facility]  Phone: [phone] Fax: [phone]  Mon.-Fri. 8:30 a.m.-5 p.m. [facility]  [address] Sherian Epley, [address] [place] [address]  Phone: [phone] Fax: [phone]  Mon.-Fri. 8 a.m.-4:30 p.m. [facility]5 [address], [facility] Stephen Henriquez  Phone: [phone]  Fax: [phone]  Mon-Fri 7:30 am 4 p.m. Please call ahead to check hours.

## 2020-09-21 DIAGNOSIS — E11.9 TYPE 2 DIABETES MELLITUS WITH HEMOGLOBIN A1C GOAL OF 7.0%-8.0% (HCC): ICD-10-CM

## 2020-09-21 LAB
A/G RATIO: 2.6 (ref 1.1–2.2)
ALBUMIN SERPL-MCNC: 4.5 G/DL (ref 3.4–5)
ALP BLD-CCNC: 60 U/L (ref 40–129)
ALT SERPL-CCNC: 21 U/L (ref 10–40)
ANION GAP SERPL CALCULATED.3IONS-SCNC: 11 MMOL/L (ref 3–16)
AST SERPL-CCNC: 17 U/L (ref 15–37)
BILIRUB SERPL-MCNC: 0.4 MG/DL (ref 0–1)
BUN BLDV-MCNC: 17 MG/DL (ref 7–20)
CALCIUM SERPL-MCNC: 9.6 MG/DL (ref 8.3–10.6)
CHLORIDE BLD-SCNC: 105 MMOL/L (ref 99–110)
CO2: 26 MMOL/L (ref 21–32)
CREAT SERPL-MCNC: 1 MG/DL (ref 0.9–1.3)
GFR AFRICAN AMERICAN: >60
GFR NON-AFRICAN AMERICAN: >60
GLOBULIN: 1.7 G/DL
GLUCOSE BLD-MCNC: 114 MG/DL (ref 70–99)
POTASSIUM SERPL-SCNC: 4.6 MMOL/L (ref 3.5–5.1)
SODIUM BLD-SCNC: 142 MMOL/L (ref 136–145)
TOTAL PROTEIN: 6.2 G/DL (ref 6.4–8.2)

## 2020-09-22 LAB
ESTIMATED AVERAGE GLUCOSE: 145.6 MG/DL
HBA1C MFR BLD: 6.7 %

## 2021-03-17 ENCOUNTER — OFFICE VISIT (OUTPATIENT)
Dept: FAMILY MEDICINE CLINIC | Age: 46
End: 2021-03-17
Payer: COMMERCIAL

## 2021-03-17 VITALS
HEART RATE: 90 BPM | SYSTOLIC BLOOD PRESSURE: 128 MMHG | DIASTOLIC BLOOD PRESSURE: 90 MMHG | TEMPERATURE: 97.7 F | RESPIRATION RATE: 16 BRPM | WEIGHT: 207.4 LBS | OXYGEN SATURATION: 98 % | BODY MASS INDEX: 32 KG/M2

## 2021-03-17 DIAGNOSIS — E11.9 TYPE 2 DIABETES MELLITUS WITH HEMOGLOBIN A1C GOAL OF 7.0%-8.0% (HCC): Primary | ICD-10-CM

## 2021-03-17 DIAGNOSIS — Z87.19 HX OF PANCREATITIS: ICD-10-CM

## 2021-03-17 PROCEDURE — G8427 DOCREV CUR MEDS BY ELIG CLIN: HCPCS | Performed by: FAMILY MEDICINE

## 2021-03-17 PROCEDURE — 3046F HEMOGLOBIN A1C LEVEL >9.0%: CPT | Performed by: FAMILY MEDICINE

## 2021-03-17 PROCEDURE — 2022F DILAT RTA XM EVC RTNOPTHY: CPT | Performed by: FAMILY MEDICINE

## 2021-03-17 PROCEDURE — 99213 OFFICE O/P EST LOW 20 MIN: CPT | Performed by: FAMILY MEDICINE

## 2021-03-17 PROCEDURE — 4004F PT TOBACCO SCREEN RCVD TLK: CPT | Performed by: FAMILY MEDICINE

## 2021-03-17 PROCEDURE — G8484 FLU IMMUNIZE NO ADMIN: HCPCS | Performed by: FAMILY MEDICINE

## 2021-03-17 PROCEDURE — G8417 CALC BMI ABV UP PARAM F/U: HCPCS | Performed by: FAMILY MEDICINE

## 2021-03-17 SDOH — ECONOMIC STABILITY: INCOME INSECURITY: HOW HARD IS IT FOR YOU TO PAY FOR THE VERY BASICS LIKE FOOD, HOUSING, MEDICAL CARE, AND HEATING?: NOT HARD AT ALL

## 2021-03-17 ASSESSMENT — PATIENT HEALTH QUESTIONNAIRE - PHQ9
SUM OF ALL RESPONSES TO PHQ QUESTIONS 1-9: 0
2. FEELING DOWN, DEPRESSED OR HOPELESS: 0
SUM OF ALL RESPONSES TO PHQ QUESTIONS 1-9: 0
SUM OF ALL RESPONSES TO PHQ9 QUESTIONS 1 & 2: 0
SUM OF ALL RESPONSES TO PHQ QUESTIONS 1-9: 0

## 2021-03-17 NOTE — PATIENT INSTRUCTIONS
F/u in 3 to 6 months depending on lab results. NO APPOINTMENT NECESSARY  WE ACCEPT ALL MAJOR INSURANCE PLANS  WE ACCEPT SELF PAYING PATIENTS  CALL [phone] FOR MORE INFORMATION    [facility] Services  [facility] [address], [address] [place] [address]  Phone: [phone] Fax: [phone]  Mon.Fri. 7 a. m.5 p.m. Sat. 8 a.m.Noon    1114 [facility] Travis Manuel  Phone: [phone] and [phone]  Fax: [phone]  Mon.Fri. 7:30 a. m.4 p.m. Flory Jesus 13  [facility] 70  Phone: [phone]  Mon.Fri. 7:30 a. m.4 p.m.
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
## 2021-03-25 DIAGNOSIS — E11.9 TYPE 2 DIABETES MELLITUS WITH HEMOGLOBIN A1C GOAL OF 7.0%-8.0% (HCC): ICD-10-CM

## 2021-03-25 DIAGNOSIS — Z87.19 HX OF PANCREATITIS: ICD-10-CM

## 2021-03-25 LAB
A/G RATIO: 2.5 (ref 1.1–2.2)
ALBUMIN SERPL-MCNC: 4.5 G/DL (ref 3.4–5)
ALP BLD-CCNC: 61 U/L (ref 40–129)
ALT SERPL-CCNC: 19 U/L (ref 10–40)
ANION GAP SERPL CALCULATED.3IONS-SCNC: 10 MMOL/L (ref 3–16)
AST SERPL-CCNC: 14 U/L (ref 15–37)
BILIRUB SERPL-MCNC: 0.3 MG/DL (ref 0–1)
BUN BLDV-MCNC: 17 MG/DL (ref 7–20)
CALCIUM SERPL-MCNC: 9.2 MG/DL (ref 8.3–10.6)
CHLORIDE BLD-SCNC: 105 MMOL/L (ref 99–110)
CHOLESTEROL, TOTAL: 148 MG/DL (ref 0–199)
CO2: 26 MMOL/L (ref 21–32)
CREAT SERPL-MCNC: 0.9 MG/DL (ref 0.9–1.3)
ESTIMATED AVERAGE GLUCOSE: 142.7 MG/DL
GFR AFRICAN AMERICAN: >60
GFR NON-AFRICAN AMERICAN: >60
GLOBULIN: 1.8 G/DL
GLUCOSE BLD-MCNC: 107 MG/DL (ref 70–99)
HBA1C MFR BLD: 6.6 %
HDLC SERPL-MCNC: 34 MG/DL (ref 40–60)
LDL CHOLESTEROL CALCULATED: 100 MG/DL
LIPASE: 36 U/L (ref 13–60)
POTASSIUM SERPL-SCNC: 4.8 MMOL/L (ref 3.5–5.1)
SODIUM BLD-SCNC: 141 MMOL/L (ref 136–145)
TOTAL PROTEIN: 6.3 G/DL (ref 6.4–8.2)
TRIGL SERPL-MCNC: 70 MG/DL (ref 0–150)
VLDLC SERPL CALC-MCNC: 14 MG/DL

## 2021-06-02 DIAGNOSIS — E11.9 TYPE 2 DIABETES MELLITUS WITH HEMOGLOBIN A1C GOAL OF 7.0%-8.0% (HCC): ICD-10-CM

## 2021-06-02 RX ORDER — LISINOPRIL 10 MG/1
TABLET ORAL
Qty: 21 TABLET | Refills: 11 | Status: SHIPPED | OUTPATIENT
Start: 2021-06-02 | End: 2022-02-07 | Stop reason: SDUPTHER

## 2021-06-02 NOTE — TELEPHONE ENCOUNTER
Requested Prescriptions     Pending Prescriptions Disp Refills    lisinopril (PRINIVIL;ZESTRIL) 10 MG tablet [Pharmacy Med Name: LISINOPRIL 10 MG TABLET] 21 tablet 11     Sig: TAKE 1 TABLET BY MOUTH EVERY DAY       LOV 3/17/2021  No f/u  Labs 3/25/21

## 2021-08-26 ENCOUNTER — OFFICE VISIT (OUTPATIENT)
Dept: FAMILY MEDICINE CLINIC | Age: 46
End: 2021-08-26
Payer: COMMERCIAL

## 2021-08-26 VITALS
BODY MASS INDEX: 32.71 KG/M2 | RESPIRATION RATE: 14 BRPM | OXYGEN SATURATION: 98 % | DIASTOLIC BLOOD PRESSURE: 86 MMHG | WEIGHT: 212 LBS | TEMPERATURE: 97.3 F | HEART RATE: 99 BPM | SYSTOLIC BLOOD PRESSURE: 138 MMHG

## 2021-08-26 DIAGNOSIS — R20.2 PARESTHESIA OF BOTH FEET: ICD-10-CM

## 2021-08-26 DIAGNOSIS — E11.9 TYPE 2 DIABETES MELLITUS WITH HEMOGLOBIN A1C GOAL OF 7.0%-8.0% (HCC): Primary | ICD-10-CM

## 2021-08-26 DIAGNOSIS — F17.200 TOBACCO DEPENDENCE: ICD-10-CM

## 2021-08-26 PROCEDURE — 99214 OFFICE O/P EST MOD 30 MIN: CPT | Performed by: FAMILY MEDICINE

## 2021-08-26 PROCEDURE — G8417 CALC BMI ABV UP PARAM F/U: HCPCS | Performed by: FAMILY MEDICINE

## 2021-08-26 PROCEDURE — 3044F HG A1C LEVEL LT 7.0%: CPT | Performed by: FAMILY MEDICINE

## 2021-08-26 PROCEDURE — G8427 DOCREV CUR MEDS BY ELIG CLIN: HCPCS | Performed by: FAMILY MEDICINE

## 2021-08-26 PROCEDURE — 2022F DILAT RTA XM EVC RTNOPTHY: CPT | Performed by: FAMILY MEDICINE

## 2021-08-26 PROCEDURE — 4004F PT TOBACCO SCREEN RCVD TLK: CPT | Performed by: FAMILY MEDICINE

## 2021-08-26 NOTE — PROGRESS NOTES
[facility]  Progress Note  DO Codi Bishop  1975 08/26/21    Chief Complaint:   Codi Ramirez is a 51 y.o. male who is here for follow-up on diabetes        HPI:   Experiencing neuropathy. Completed his blood work in March. A1c under 7%. Continues to smoke. 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 Finds it stressful at times. The neuropathy episodes affect his ability to work and sometimes needs to leave work early or missed that shift. Has [employer] paperwork for me to complete. Because of his work he is not permitted to take certain prescription medication. Uses Biofreeze to try to deal with the foot neuropathy symptoms. ROS negative for headache, visionchanges, chest pain, shortness of breath, abdominal pain, urinary sx, bowel changes. Past medical, surgical, and social history reviewed. and allergies reviewed. Allergies   Allergen Reactions    Sulfa Antibiotics Itching and Other (See Comments)     Skin turns red     Prior to Visit Medications    Medication Sig Taking?  Authorizing Provider   diclofenac sodium (VOLTAREN) 1 % GEL Apply topically 2 times daily Yes Joe Mcclelland, DO   lisinopril (PRINIVIL;ZESTRIL) 10 MG tablet TAKE 1 TABLET BY MOUTH EVERY DAY Yes Joe Mcclelland DO   buPROPion (WELLBUTRIN XL) 150 MG extended release tablet TAKE 1 TABLET BY MOUTH EVERY DAY IN THE MORNING Yes Mayte Mcclelland DO   pioglitazone (ACTOS) 30 MG tablet TAKE 1 TABLET BY MOUTH EVERY DAY Yes Mayte Mcclelland DO   atorvastatin (LIPITOR) 10 MG tablet TAKE 1 TABLET BY MOUTH EVERY DAY Yes Mayte Mcclelland DO          Vitals:    08/26/21 1021 08/26/21 1115   BP: (!) 154/94 138/86   Pulse: 99    Resp: 14    Temp: 97.3 °F (36.3 °C)    TempSrc: Temporal    SpO2: 98%    Weight: 212 lb (96.2 kg)       Wt Readings from Last 3 Encounters:   08/26/21 212 lb (96.2 kg)   03/17/21 207 lb 6.4 oz (94.1 kg)   08/25/20 210 lb (95.3 kg)     BP Readings from Last 3 Encounters:   08/26/21 138/86   03/17/21 (!) 128/90   08/25/20 (!) 138/90       Patient Active Problem List   Diagnosis    Tobacco dependence    Fatty liver    Idiopathic acute pancreatitis    Gastritis without bleeding    Schatzki's ring of distal esophagus         There is no immunization history on file for this patient. No past medical history on file. Past Surgical History:   Procedure Laterality Date    UPPER GASTROINTESTINAL ENDOSCOPY N/A 10/9/2018    EGD BIOPSY performed by Maria Esther Aguiar MD at [facility]     Family History   Problem Relation Age of Onset    Cancer Father 61    Diabetes Brother     Diabetes Maternal Grandmother     Diabetes Paternal Grandmother      Social History     Socioeconomic History    Marital status:      Spouse name: Not on file    Number of children: Not on file    Years of education: Not on file    Highest education level: Not on file   Occupational History    Not on file   Tobacco Use    Smoking status: Current Every Day Smoker     Packs/day: 1.00     Years: 30.00     Pack years: 30.00     Types: Cigarettes     Start date: 1/1/1988    Smokeless tobacco: Current User    Tobacco comment: nicotine patches, no tobacco in it   Vaping Use    Vaping Use: Former    Substances: Always   Substance and Sexual Activity    Alcohol use:  Yes     Alcohol/week: 2.0 standard drinks     Types: 2 Standard drinks or equivalent per week    Drug use: No    Sexual activity: Yes     Partners: Female   Other Topics Concern    Not on file   Social History Narrative    Not on file     Social Determinants of Health     Financial Resource Strain: Low Risk     Difficulty of Paying Living Expenses: Not hard at all   Food Insecurity: No Food Insecurity    Worried About [address] in the Last Year: Never true    [address] in the Last Year: Never true   Transportation Needs: No Transportation Needs    Lack of Transportation (Medical): No    Lack of Transportation (Non-Medical): No   Physical Activity:     Days of Exercise per Week:     Minutes of Exercise per Session:    Stress:     Feeling of Stress :    Social Connections:     Frequency of Communication with Friends and Family:     Frequency of Social Gatherings with Friends and Family:     Attends Buddhism Services:     Active Member of Clubs or Organizations:     Attends Club or Organization Meetings:     Marital Status:    Intimate Partner Violence:     Fear of Current or Ex-Partner:     Emotionally Abused:     Physically Abused:     Sexually Abused:        O: /86   Pulse 99   Temp 97.3 °F (36.3 °C) (Temporal)   Resp 14   Wt 212 lb (96.2 kg)   SpO2 98%   BMI 32.71 kg/m²   Physical Exam  GEN: No acute distress,cooperative, well nourished, alert. HEENT: PEERLA, EOMI , normocephalic/atraumatic, external nose appears normal.  External ear is normal.    Neck: soft, supple, no appreciable thyromegaly,mass  CV: No upper extremity edema. Resp:  Breathing comfortably. Psych:normal affect. Neuro: AOx3  Other Pertinent Physical Exam findings:  Sensory exam of the foot is normal, tested with the monofilament. Good pulses, no lesions or ulcers, good peripheral pulses. ASSESSMENT   Diagnosis Orders   1. Type 2 diabetes mellitus with hemoglobin A1c goal of 7.0%-8.0% ([facility])   DIABETES FOOT EXAM    Hemoglobin A1C    Comprehensive Metabolic Panel    Lipid Panel    Microalbumin / Creatinine Urine Ratio   2. Paresthesia of both feet  diclofenac sodium (VOLTAREN) 1 % GEL   3. Tobacco dependence         #1:  Blood work. We will plan to get the next 30 days. #2:  Try diclofenac gel. With only paperwork completed today. #3: Encouraged cessation. Discussed with patient treatment options, and programs to help pt quit. Pt verbalizes understanding, aware of risks of persistent tobacco usage.       PLAN          Time spent on encounter (including any number of the following: review of labs, imaging, provider notes, outside hospital records; performing examination/evaluation; counseling patient and family; ordering medications/tests; placing referrals and communication with referring physicians; coordination of care, and documentation in the EHR): 32 minutes  Established E/M: 10-19 (10931), 20-29 (96930), 30-39 (39602), 40-54 (89193)   New E/M: 15-29 (85444), 30-44 (24386), 45-59 (23363), 60-74 (89592)  Telephone E/M: 5-10 (Anna.Frieda), 11-20 (38162), 21-30 (85352)    If applicable, see additional patient information and instructions under \"Patient Instructions. \"    Return in about 6 months (around 2/26/2022) for Diabetes. Patient Instructions   GET THE LABWORK DONE. CONTINUE DREAMING ABOUT THE FINISHED DECK TO CUT BACK ON CIGARETTES. Please note a portion of this chart was generated using dragon dictation software. Although every effort was made to ensure the accuracy of this automated transcription,some errors in transcription may have occurred.

## 2021-09-07 DIAGNOSIS — Z72.0 TOBACCO USE: ICD-10-CM

## 2021-09-07 DIAGNOSIS — E11.9 TYPE 2 DIABETES MELLITUS WITH HEMOGLOBIN A1C GOAL OF 7.0%-8.0% (HCC): ICD-10-CM

## 2021-09-07 RX ORDER — ATORVASTATIN CALCIUM 10 MG/1
TABLET, FILM COATED ORAL
Qty: 90 TABLET | Refills: 2 | Status: SHIPPED | OUTPATIENT
Start: 2021-09-07 | End: 2022-05-09

## 2021-09-07 RX ORDER — PIOGLITAZONEHYDROCHLORIDE 30 MG/1
TABLET ORAL
Qty: 90 TABLET | Refills: 2 | Status: SHIPPED | OUTPATIENT
Start: 2021-09-07 | End: 2022-02-07 | Stop reason: SDUPTHER

## 2021-09-07 RX ORDER — BUPROPION HYDROCHLORIDE 150 MG/1
TABLET ORAL
Qty: 90 TABLET | Refills: 2 | Status: SHIPPED | OUTPATIENT
Start: 2021-09-07 | End: 2022-02-07 | Stop reason: SDUPTHER

## 2022-02-07 ENCOUNTER — OFFICE VISIT (OUTPATIENT)
Dept: FAMILY MEDICINE CLINIC | Age: 47
End: 2022-02-07
Payer: COMMERCIAL

## 2022-02-07 VITALS
RESPIRATION RATE: 12 BRPM | OXYGEN SATURATION: 97 % | BODY MASS INDEX: 32.16 KG/M2 | WEIGHT: 208.4 LBS | HEART RATE: 102 BPM | DIASTOLIC BLOOD PRESSURE: 90 MMHG | TEMPERATURE: 97.7 F | SYSTOLIC BLOOD PRESSURE: 140 MMHG

## 2022-02-07 DIAGNOSIS — E11.9 TYPE 2 DIABETES MELLITUS WITH HEMOGLOBIN A1C GOAL OF 7.0%-8.0% (HCC): Primary | ICD-10-CM

## 2022-02-07 DIAGNOSIS — Z72.0 TOBACCO USE: ICD-10-CM

## 2022-02-07 DIAGNOSIS — Z87.19 HX OF PANCREATITIS: ICD-10-CM

## 2022-02-07 DIAGNOSIS — R10.13 EPIGASTRIC PAIN: ICD-10-CM

## 2022-02-07 PROCEDURE — G8427 DOCREV CUR MEDS BY ELIG CLIN: HCPCS | Performed by: FAMILY MEDICINE

## 2022-02-07 PROCEDURE — G8417 CALC BMI ABV UP PARAM F/U: HCPCS | Performed by: FAMILY MEDICINE

## 2022-02-07 PROCEDURE — 3046F HEMOGLOBIN A1C LEVEL >9.0%: CPT | Performed by: FAMILY MEDICINE

## 2022-02-07 PROCEDURE — G8484 FLU IMMUNIZE NO ADMIN: HCPCS | Performed by: FAMILY MEDICINE

## 2022-02-07 PROCEDURE — 4004F PT TOBACCO SCREEN RCVD TLK: CPT | Performed by: FAMILY MEDICINE

## 2022-02-07 PROCEDURE — 99213 OFFICE O/P EST LOW 20 MIN: CPT | Performed by: FAMILY MEDICINE

## 2022-02-07 PROCEDURE — 2022F DILAT RTA XM EVC RTNOPTHY: CPT | Performed by: FAMILY MEDICINE

## 2022-02-07 RX ORDER — PRAVASTATIN SODIUM 20 MG
20 TABLET ORAL DAILY
Qty: 90 TABLET | Refills: 3 | Status: SHIPPED | OUTPATIENT
Start: 2022-02-07

## 2022-02-07 RX ORDER — LISINOPRIL 10 MG/1
TABLET ORAL
Qty: 90 TABLET | Refills: 3 | Status: SHIPPED | OUTPATIENT
Start: 2022-02-07

## 2022-02-07 RX ORDER — PIOGLITAZONEHYDROCHLORIDE 30 MG/1
TABLET ORAL
Qty: 90 TABLET | Refills: 3 | Status: SHIPPED | OUTPATIENT
Start: 2022-02-07

## 2022-02-07 RX ORDER — BUPROPION HYDROCHLORIDE 150 MG/1
TABLET ORAL
Qty: 90 TABLET | Refills: 3 | Status: SHIPPED | OUTPATIENT
Start: 2022-02-07 | End: 2022-09-19

## 2022-02-07 NOTE — PATIENT INSTRUCTIONS
Your doctor will be part of [facility] until March 11. You are certainly welcome to continue your healthcare needs with Dr. Eulalia Flanagan until then. To find a different primary care provider consider calling the \"find a doctor line\" at [phone]    -or-    --go to [employer]  --enter your location as your home zip code  --click \"save my location\"  --click \"find a doctor\"  --click \"Primary Care\"  --check out the reviews on the physician, nurse practitioner, or physician assistant that you are interested in.  --make sure that the provider is taking new patients (there should be a message on the right of the screen \"New Patient availability within. Kristian Turcios \"  --click \"book on-line. \"      --follow the prompts    -or-    Schedule an appointment in order to spend time for Dr. Eulalia Flanagan to assist you in finding a good replacement match  --Using an active [employer] account  --Logging on [employer], searching \"Bingcang\" and setting up appointment  --or calling the scheduling line at [phone]. If appointment times are not available for your preferred day and time, then call the clinic back at [phone] or send a [employer] message and the clinic team will assist.    Thank you.     Dr. Eulalia Flanagan

## 2022-02-07 NOTE — PROGRESS NOTES
[facility]  Progress Note  Sheryl Sibley DO          Chang Hutchins  1975 02/07/22    Chief Complaint:   Chang Hutchins is a 51 y.o. male who is here for f/u on DM        HPI:   Diabetes Mellitus Type II:  Home blood sugar records: fasting range: upper 100s. No significant episodes of hypoglycemia. No polyuria, polydipsia, visual changes, foot problems, GI upset. Diabetic diet compliance:  compliant most of the time,  Weight trend: increasing steadily. Current exercise: yard work. Eye exam current (within one year): unknown. ROS negative for headache, visionchanges, chest pain, shortness of breath, abdominal pain, urinary sx, bowel changes. Occasional heartburn. Would like lipase rechecked since he had pancreatitis episode > 3 years ago. Past medical, surgical, and social history reviewed. and allergies reviewed. Allergies   Allergen Reactions    Sulfa Antibiotics Itching and Other (See Comments)     Skin turns red     Prior to Visit Medications    Medication Sig Taking?  Authorizing Provider   buPROPion (WELLBUTRIN XL) 150 MG extended release tablet TAKE 1 TABLET BY MOUTH EVERY DAY IN THE MORNING Yes Eyvonne Councilman Bingcang, DO   pioglitazone (ACTOS) 30 MG tablet TAKE 1 TABLET BY MOUTH EVERY DAY Yes Eyvonne Councilman Bingcang, DO   lisinopril (PRINIVIL;ZESTRIL) 10 MG tablet TAKE 1 TABLET BY MOUTH EVERY DAY Yes Eyvonne Councilman Bingcang, DO   pravastatin (PRAVACHOL) 20 MG tablet Take 1 tablet by mouth daily Yes Eyvonne Councilman Bingcang, DO   atorvastatin (LIPITOR) 10 MG tablet TAKE 1 TABLET BY MOUTH EVERY DAY Yes Eyvonne Councilman Bingcang, DO          Vitals:    02/07/22 0848   BP: (!) 140/90   Pulse: 102   Resp: 12   Temp: 97.7 °F (36.5 °C)   TempSrc: Temporal   SpO2: 97%   Weight: 208 lb 6.4 oz (94.5 kg)      Wt Readings from Last 3 Encounters:   02/07/22 208 lb 6.4 oz (94.5 kg)   08/26/21 212 lb (96.2 kg)   03/17/21 207 lb 6.4 oz (94.1 kg)     BP Readings from Last 3 Encounters:   02/07/22 (!) 140/90   08/26/21 138/86   03/17/21 (!) 128/90       Patient Active Problem List   Diagnosis    Tobacco dependence    Fatty liver    Idiopathic acute pancreatitis    Gastritis without bleeding    Schatzki's ring of distal esophagus         There is no immunization history on file for this patient. No past medical history on file. Past Surgical History:   Procedure Laterality Date    UPPER GASTROINTESTINAL ENDOSCOPY N/A 10/9/2018    EGD BIOPSY performed by Noah Martino MD at [facility]     Family History   Problem Relation Age of Onset    Cancer Father 61    Diabetes Brother     Diabetes Maternal Grandmother     Diabetes Paternal Grandmother      Social History     Socioeconomic History    Marital status:      Spouse name: Not on file    Number of children: Not on file    Years of education: Not on file    Highest education level: Not on file   Occupational History    Not on file   Tobacco Use    Smoking status: Current Every Day Smoker     Packs/day: 1.00     Years: 30.00     Pack years: 30.00     Types: Cigarettes     Start date: 1/1/1988    Smokeless tobacco: Current User    Tobacco comment: nicotine patches, no tobacco in it   Vaping Use    Vaping Use: Former    Substances: Always   Substance and Sexual Activity    Alcohol use: Yes     Alcohol/week: 2.0 standard drinks     Types: 2 Standard drinks or equivalent per week    Drug use: No    Sexual activity: Yes     Partners: Female   Other Topics Concern    Not on file   Social History Narrative    Not on file     Social Determinants of Health     Financial Resource Strain: Low Risk     Difficulty of Paying Living Expenses: Not hard at all   Food Insecurity: No Food Insecurity    Worried About [address] in the Last Year: Never true    [address] in the Last Year: Never true   Transportation Needs: No Transportation Needs    Lack of Transportation (Medical):  No    Lack of Transportation (Non-Medical): No   Physical Activity:     Days of Exercise per Week: Not on file    Minutes of Exercise per Session: Not on file   Stress:     Feeling of Stress : Not on file   Social Connections:     Frequency of Communication with Friends and Family: Not on file    Frequency of Social Gatherings with Friends and Family: Not on file    Attends Jewish Services: Not on file    Active Member of [address] [place] [address] or Organizations: Not on file    Attends Club or Organization Meetings: Not on file    Marital Status: Not on file   Intimate Partner Violence:     Fear of Current or Ex-Partner: Not on file    Emotionally Abused: Not on file    Physically Abused: Not on file    Sexually Abused: Not on file   Housing Stability:     Unable to Pay for Housing in the Last Year: Not on file    Number of [place] in the Last Year: Not on file    Unstable Housing in the Last Year: Not on file       O: BP (!) 140/90   Pulse 102   Temp 97.7 °F (36.5 °C) (Temporal)   Resp 12   Wt 208 lb 6.4 oz (94.5 kg)   SpO2 97%   BMI 32.16 kg/m²   Physical Exam  GEN: No acute distress,cooperative, well nourished, alert. HEENT: PEERLA, EOMI , normocephalic/atraumatic, external nose appears normal.  External ear is normal.    Neck: soft, supple, no appreciable thyromegaly,mass  CV: No upper extremity edema. Resp:  Breathing comfortably. Psych:normal affect. Neuro: AOx3  Other Pertinent Physical Exam findings: n/a        ASSESSMENT   Diagnosis Orders   1. Type 2 diabetes mellitus with hemoglobin A1c goal of 7.0%-8.0% ([facility])  Hemoglobin A1C    Comprehensive Metabolic Panel    Lipid Panel    Microalbumin / Creatinine Urine Ratio    pioglitazone (ACTOS) 30 MG tablet    lisinopril (PRINIVIL;ZESTRIL) 10 MG tablet    pravastatin (PRAVACHOL) 20 MG tablet   2. Hx of pancreatitis  LIPASE   3. Epigastric pain  LIPASE   4. Tobacco use  buPROPion (WELLBUTRIN XL) 150 MG extended release tablet     #1: due for labwork.  Atorvastatin no longer on insurance plan. Switch to pravastatin. #2-3: recheck lipase. #4: The current medical regimen is effective;  continue present plan and medications. Encouraged cessation. Discussed with patient treatment options, and programs to help pt quit. Pt verbalizes understanding, aware of risks of persistent tobacco usage. Encouraged to establish with replacement PCP. PLAN          Time spent on encounter (including any number of the following: review of labs, imaging, provider notes, outside hospital records; performing examination/evaluation; counseling patient and family; ordering medications/tests; placing referrals and communication with referring physicians; coordination of care, and documentation in the EHR): 26 minutes  Established E/M: 10-19 (39486), 20-29 (28807), 30-39 (99985), 40-54 (95202)   New E/M: 15-29 (83361), 30-44 (79710), 45-59 (68288), 60-74 (15642)  Telephone E/M: 5-10 ([email or web address]), 11-20 (99268), 21-30 (93953)    If applicable, see additional patient information and instructions under \"Patient Instructions. \"    Return in about 6 months (around 8/7/2022) for Diabetes, Smoking cessation, Hypertension. Patient Instructions       Your doctor will be part of [facility] until March 11. You are certainly welcome to continue your healthcare needs with Dr. Irena Hogan until then. To find a different primary care provider consider calling the \"find a doctor line\" at [phone]    -or-    --go to [email or web address]. [email or web address]  --enter your location as your home zip code  --click \"save my location\"  --click \"find a doctor\"  --click \"Primary Care\"  --check out the reviews on the physician, nurse practitioner, or physician assistant that you are interested in.  --make sure that the provider is taking new patients (there should be a message on the right of the screen \"New Patient availability within. Lloyd Claros \"  --click \"book on-line. \"      --follow the prompts    -or-    Schedule an appointment in order to spend time for  Stas to assist you in finding a good replacement match  --Using an active [employer] account  --Logging on [facility] [employer], searching \"[place]\" and setting up appointment  --or calling the scheduling line at [phone]. If appointment times are not available for your preferred day and time, then call the clinic back at [phone] or send a [employer] message and the clinic team will assist.    Thank you. Dr. Michael Diallo          Please note a portion of this chart was generated using dragon dictation software. Although every effort was made to ensure the accuracy of this automated transcription,some errors in transcription may have occurred.

## 2022-02-18 ENCOUNTER — TELEPHONE (OUTPATIENT)
Dept: FAMILY MEDICINE CLINIC | Age: 47
End: 2022-02-18

## 2022-02-18 ENCOUNTER — PATIENT MESSAGE (OUTPATIENT)
Dept: FAMILY MEDICINE CLINIC | Age: 47
End: 2022-02-18

## 2022-02-18 DIAGNOSIS — G57.90 NEUROPATHY OF FOOT, UNSPECIFIED LATERALITY: Primary | ICD-10-CM

## 2022-02-18 NOTE — TELEPHONE ENCOUNTER
Patient called requesting new R/X for gabapentin since he  is working for a new company that doesn't have restriction that his old job had. He would like to be notified through [employer] if and when the order has been placed. Thanks.

## 2022-02-21 NOTE — TELEPHONE ENCOUNTER
From: Julnadeem   Sent: 2/18/2022 7:05 PM EST  To: [facility] [address] Staff  Subject: Gabapentin    I am not taking gabapentin. I was unable to use that prescription [place] worked [place] [employer]. It was something you had suggested putting me on at one time. I am able to take that medication at [facility].  I was wanting to start gabapentin for pain management during flair ups

## 2022-02-21 NOTE — TELEPHONE ENCOUNTER
I am not taking gabapentin.  I was unable to use that prescription while I worked at the [employer] [place] was something you had suggested putting me on at one time.  I am able to take that medication at [address] was wanting to start gabapentin for pain management during flair ups    He is not sure of the dosage.

## 2022-02-22 RX ORDER — GABAPENTIN 300 MG/1
300 CAPSULE ORAL 2 TIMES DAILY
Qty: 60 CAPSULE | Refills: 5 | Status: SHIPPED | OUTPATIENT
Start: 2022-02-22 | End: 2022-09-22 | Stop reason: SDUPTHER

## 2022-02-28 DIAGNOSIS — E11.9 TYPE 2 DIABETES MELLITUS WITH HEMOGLOBIN A1C GOAL OF 7.0%-8.0% (HCC): ICD-10-CM

## 2022-02-28 DIAGNOSIS — Z87.19 HX OF PANCREATITIS: ICD-10-CM

## 2022-02-28 DIAGNOSIS — R10.13 EPIGASTRIC PAIN: ICD-10-CM

## 2022-02-28 LAB
A/G RATIO: 3.2 (ref 1.1–2.2)
ALBUMIN SERPL-MCNC: 4.8 G/DL (ref 3.4–5)
ALP BLD-CCNC: 64 U/L (ref 40–129)
ALT SERPL-CCNC: 20 U/L (ref 10–40)
ANION GAP SERPL CALCULATED.3IONS-SCNC: 13 MMOL/L (ref 3–16)
AST SERPL-CCNC: 16 U/L (ref 15–37)
BILIRUB SERPL-MCNC: <0.2 MG/DL (ref 0–1)
BUN BLDV-MCNC: 20 MG/DL (ref 7–20)
CALCIUM SERPL-MCNC: 9.6 MG/DL (ref 8.3–10.6)
CHLORIDE BLD-SCNC: 106 MMOL/L (ref 99–110)
CHOLESTEROL, TOTAL: 135 MG/DL (ref 0–199)
CO2: 24 MMOL/L (ref 21–32)
CREAT SERPL-MCNC: 0.9 MG/DL (ref 0.9–1.3)
CREATININE URINE: 115.8 MG/DL (ref 39–259)
GFR AFRICAN AMERICAN: >60
GFR NON-AFRICAN AMERICAN: >60
GLUCOSE BLD-MCNC: 102 MG/DL (ref 70–99)
HDLC SERPL-MCNC: 43 MG/DL (ref 40–60)
LDL CHOLESTEROL CALCULATED: 75 MG/DL
LIPASE: 24 U/L (ref 13–60)
MICROALBUMIN UR-MCNC: <1.2 MG/DL
MICROALBUMIN/CREAT UR-RTO: NORMAL MG/G (ref 0–30)
POTASSIUM SERPL-SCNC: 4.9 MMOL/L (ref 3.5–5.1)
SODIUM BLD-SCNC: 143 MMOL/L (ref 136–145)
TOTAL PROTEIN: 6.3 G/DL (ref 6.4–8.2)
TRIGL SERPL-MCNC: 83 MG/DL (ref 0–150)
VLDLC SERPL CALC-MCNC: 17 MG/DL

## 2022-03-01 LAB
ESTIMATED AVERAGE GLUCOSE: 148.5 MG/DL
HBA1C MFR BLD: 6.8 %

## 2022-05-09 ENCOUNTER — OFFICE VISIT (OUTPATIENT)
Dept: INTERNAL MEDICINE CLINIC | Age: 47
End: 2022-05-09
Payer: COMMERCIAL

## 2022-05-09 VITALS
OXYGEN SATURATION: 97 % | DIASTOLIC BLOOD PRESSURE: 84 MMHG | HEIGHT: 68 IN | HEART RATE: 107 BPM | BODY MASS INDEX: 32.1 KG/M2 | WEIGHT: 211.8 LBS | SYSTOLIC BLOOD PRESSURE: 126 MMHG | RESPIRATION RATE: 12 BRPM

## 2022-05-09 DIAGNOSIS — M77.01 MEDIAL EPICONDYLITIS OF RIGHT ELBOW: ICD-10-CM

## 2022-05-09 DIAGNOSIS — Z12.11 COLON CANCER SCREENING: ICD-10-CM

## 2022-05-09 DIAGNOSIS — E78.2 MIXED HYPERLIPIDEMIA: ICD-10-CM

## 2022-05-09 DIAGNOSIS — I10 PRIMARY HYPERTENSION: ICD-10-CM

## 2022-05-09 DIAGNOSIS — F17.200 TOBACCO DEPENDENCE: ICD-10-CM

## 2022-05-09 DIAGNOSIS — E11.9 TYPE 2 DIABETES MELLITUS WITHOUT COMPLICATION, WITHOUT LONG-TERM CURRENT USE OF INSULIN (HCC): Primary | ICD-10-CM

## 2022-05-09 PROCEDURE — 3044F HG A1C LEVEL LT 7.0%: CPT | Performed by: INTERNAL MEDICINE

## 2022-05-09 PROCEDURE — 90471 IMMUNIZATION ADMIN: CPT | Performed by: INTERNAL MEDICINE

## 2022-05-09 PROCEDURE — 90715 TDAP VACCINE 7 YRS/> IM: CPT | Performed by: INTERNAL MEDICINE

## 2022-05-09 PROCEDURE — 99214 OFFICE O/P EST MOD 30 MIN: CPT | Performed by: INTERNAL MEDICINE

## 2022-05-09 RX ORDER — ASPIRIN 81 MG/1
81 TABLET ORAL DAILY
COMMUNITY

## 2022-05-09 SDOH — ECONOMIC STABILITY: FOOD INSECURITY: WITHIN THE PAST 12 MONTHS, YOU WORRIED THAT YOUR FOOD WOULD RUN OUT BEFORE YOU GOT MONEY TO BUY MORE.: NEVER TRUE

## 2022-05-09 SDOH — ECONOMIC STABILITY: FOOD INSECURITY: WITHIN THE PAST 12 MONTHS, THE FOOD YOU BOUGHT JUST DIDN'T LAST AND YOU DIDN'T HAVE MONEY TO GET MORE.: NEVER TRUE

## 2022-05-09 ASSESSMENT — PATIENT HEALTH QUESTIONNAIRE - PHQ9
SUM OF ALL RESPONSES TO PHQ QUESTIONS 1-9: 0
SUM OF ALL RESPONSES TO PHQ9 QUESTIONS 1 & 2: 0
SUM OF ALL RESPONSES TO PHQ QUESTIONS 1-9: 0
2. FEELING DOWN, DEPRESSED OR HOPELESS: 0
1. LITTLE INTEREST OR PLEASURE IN DOING THINGS: 0
SUM OF ALL RESPONSES TO PHQ QUESTIONS 1-9: 0
SUM OF ALL RESPONSES TO PHQ QUESTIONS 1-9: 0

## 2022-05-09 ASSESSMENT — SOCIAL DETERMINANTS OF HEALTH (SDOH): HOW HARD IS IT FOR YOU TO PAY FOR THE VERY BASICS LIKE FOOD, HOUSING, MEDICAL CARE, AND HEATING?: NOT HARD AT ALL

## 2022-05-09 NOTE — PROGRESS NOTES
[facility]  Internal Medicine  New Patient Note  Francisco Lalitha,       2022    Nellie Winn (:  1975) is a 51 y.o. male, here for evaluation of the following medical concerns:      SUBJECTIVE:    HPI     Patient is a 50-year-old male with past medical history of hypertension, diabetes, hyperlipidemia and tobacco dependence who presents secondary to follow-up for the above listed chronic diseases. DM: does not check bs. Is taking Actos. Does not note that he feels like he has many low blood sugars. Diabetes has been well controlled since starting medication. Patient is taking Wellbutrin for smoking cessation, only quit for a few days to 1 week. Tried chantix in the past but had side effects. Would like to get off this medication    Patient also notes some pain in the right elbow. Patient notes that work has worsened discomfort. Patient notes that his job changed about 3 weeks ago and that has improved the pain. He has tried Tylenol without complete resolution. Review of Systems ROS negative except for those noted in the HPI above.        Outpatient Medications Marked as Taking for the 22 encounter (Office Visit) with Ann Coley DO   Medication Sig Dispense Refill    aspirin 81 MG EC tablet Take 81 mg by mouth daily      buPROPion (WELLBUTRIN XL) 150 MG extended release tablet TAKE 1 TABLET BY MOUTH EVERY DAY IN THE MORNING 90 tablet 3    pioglitazone (ACTOS) 30 MG tablet TAKE 1 TABLET BY MOUTH EVERY DAY 90 tablet 3    lisinopril (PRINIVIL;ZESTRIL) 10 MG tablet TAKE 1 TABLET BY MOUTH EVERY DAY 90 tablet 3    pravastatin (PRAVACHOL) 20 MG tablet Take 1 tablet by mouth daily 90 tablet 3        Allergies   Allergen Reactions    Sulfa Antibiotics Itching and Other (See Comments)     Skin turns red       Past Medical History:   Diagnosis Date    HLD (hyperlipidemia)     Neuropathy     Type 2 diabetes mellitus without complication ([facility] [address]        Past Surgical History: Procedure Laterality Date    UPPER GASTROINTESTINAL ENDOSCOPY N/A 10/9/2018    [facility] [address] History     Socioeconomic History    Marital status:      Spouse name: Not on file    Number of children: Not on file    Years of education: Not on file    Highest education level: Not on file   Occupational History    Not on file   Tobacco Use    Smoking status: Current Every Day Smoker     Packs/day: 1.00     Years: 30.00     Pack years: 30.00     Types: Cigarettes     Start date: 1/1/1988    Smokeless tobacco: Current User    Tobacco comment: nicotine patches, no tobacco in it   Vaping Use    Vaping Use: Former    Substances: Always   Substance and Sexual Activity    Alcohol use: Yes     Alcohol/week: 2.0 standard drinks     Types: 2 Standard drinks or equivalent per week    Drug use: No    Sexual activity: Yes     Partners: Female   Other Topics Concern    Not on file   Social History Narrative    Not on file     Social Determinants of Health     Financial Resource Strain: Low Risk     Difficulty of Paying Living Expenses: Not hard at all   Food Insecurity: No Food Insecurity    Worried About [address] in the Last Year: Never true    [facility] in the Last Year: Never true   Transportation Needs:     Lack of Transportation (Medical): Not on file    Lack of Transportation (Non-Medical):  Not on file   Physical Activity:     Days of Exercise per Week: Not on file    Minutes of Exercise per Session: Not on file   Stress:     Feeling of Stress : Not on file   Social Connections:     Frequency of Communication with Friends and Family: Not on file    Frequency of Social Gatherings with Friends and Family: Not on file    Attends Christianity Services: Not on file    Active Member of Clubs or Organizations: Not on file    Attends Club or Organization Meetings: Not on file    Marital Status: Not on file Intimate Partner Violence: Not At Risk    Fear of Current or Ex-Partner: No    Emotionally Abused: No    Physically Abused: No    Sexually Abused: No   Housing Stability:     Unable to Pay for Housing in the Last Year: Not on file    Number of Places Lived in the Last Year: Not on file    Unstable Housing in the Last Year: Not on file        Family History   Problem Relation Age of Onset    Cancer Father 61    Diabetes Brother     Diabetes Maternal Grandmother     Diabetes Paternal Grandmother          OBJECTIVE:    Vitals:    05/09/22 1256   BP: 126/84   Pulse: 107   Resp: 12   SpO2: 97%   Weight: 211 lb 12.8 oz (96.1 kg)   Height: 5' 8\" (1.727 m)     Body mass index is 32.2 kg/m². Physical Exam  Constitutional:       General: He is not in acute distress. Appearance: Normal appearance. He is not ill-appearing. HENT:      Head: Normocephalic and atraumatic. Eyes:      General: No scleral icterus. Extraocular Movements: Extraocular movements intact. Conjunctiva/sclera: Conjunctivae normal.   Cardiovascular:      Rate and Rhythm: Normal rate and regular rhythm. Heart sounds: No murmur heard. No friction rub. No gallop. Pulmonary:      Effort: Pulmonary effort is normal. No respiratory distress. Breath sounds: No wheezing, rhonchi or rales. Abdominal:      General: Bowel sounds are normal. There is no distension. Palpations: Abdomen is soft. Tenderness: There is no abdominal tenderness. There is no guarding or rebound. Musculoskeletal:      Cervical back: Normal range of motion. No muscular tenderness. Right lower leg: No edema. Left lower leg: No edema. Lymphadenopathy:      Cervical: No cervical adenopathy. Skin:     General: Skin is warm. Findings: No erythema or rash. Neurological:      General: No focal deficit present. Mental Status: He is alert and oriented to person, place, and time.    Psychiatric:         Mood and Affect: Mood normal.         Behavior: Behavior normal.         ASSESSMENT/PLAN:  1. Type 2 diabetes mellitus without complication, without long-term current use of insulin (HCC)  Hemoglobin A1c in February showed well-controlled diabetes. Continue Actos. Educated on checking blood sugars in setting where he feels that it may be low. 2. Mixed hyperlipidemia  Continue statin    3. Primary hypertension  Well-controlled today in the office continue lisinopril    4. Colon cancer screening  - [facility] - Portillo Evans MD, Gastroenterology, [place]    5. Tobacco dependence  Wellbutrin has not been working. Patient educated on how to wean off this medication. 6. Medial epicondylitis of right elbow  Likely secondary to overuse from work. Patient to try brace and ibuprofen. Of note patient currently taking 2 baby aspirin daily for primary prevention. Discussed decreasing to 81 mg daily. Return in about 3 months (around 8/9/2022) for DM and HLD.      The Deborah DO

## 2022-08-18 ENCOUNTER — TELEPHONE (OUTPATIENT)
Dept: INTERNAL MEDICINE CLINIC | Age: 47
End: 2022-08-18

## 2022-08-18 DIAGNOSIS — E11.9 TYPE 2 DIABETES MELLITUS WITHOUT COMPLICATION, WITHOUT LONG-TERM CURRENT USE OF INSULIN (HCC): Primary | ICD-10-CM

## 2022-08-18 DIAGNOSIS — I10 PRIMARY HYPERTENSION: ICD-10-CM

## 2022-08-18 NOTE — TELEPHONE ENCOUNTER
Patient believes he should have an active order for Labs to complete prior to his appointment on 09/19/22. I did not see any active orders on his chart. He would like a call back to confirm/deny if labs are necessary prior to next visit as he wanted to have them completed on the same day as his upcoming appointment. Please contact patient to confirm/deny at number provided.

## 2022-09-19 ENCOUNTER — OFFICE VISIT (OUTPATIENT)
Dept: INTERNAL MEDICINE CLINIC | Age: 47
End: 2022-09-19
Payer: COMMERCIAL

## 2022-09-19 VITALS
DIASTOLIC BLOOD PRESSURE: 80 MMHG | HEART RATE: 96 BPM | WEIGHT: 213.8 LBS | BODY MASS INDEX: 32.4 KG/M2 | OXYGEN SATURATION: 97 % | SYSTOLIC BLOOD PRESSURE: 136 MMHG | HEIGHT: 68 IN

## 2022-09-19 DIAGNOSIS — F17.200 TOBACCO USE DISORDER: Primary | ICD-10-CM

## 2022-09-19 DIAGNOSIS — E11.9 TYPE 2 DIABETES MELLITUS WITHOUT COMPLICATION, WITHOUT LONG-TERM CURRENT USE OF INSULIN (HCC): ICD-10-CM

## 2022-09-19 DIAGNOSIS — I10 PRIMARY HYPERTENSION: ICD-10-CM

## 2022-09-19 DIAGNOSIS — E78.2 MIXED HYPERLIPIDEMIA: ICD-10-CM

## 2022-09-19 DIAGNOSIS — K76.0 FATTY LIVER: ICD-10-CM

## 2022-09-19 DIAGNOSIS — F17.200 TOBACCO DEPENDENCE: ICD-10-CM

## 2022-09-19 PROCEDURE — 3044F HG A1C LEVEL LT 7.0%: CPT | Performed by: INTERNAL MEDICINE

## 2022-09-19 PROCEDURE — 99214 OFFICE O/P EST MOD 30 MIN: CPT | Performed by: INTERNAL MEDICINE

## 2022-09-19 RX ORDER — BUPROPION HYDROCHLORIDE 150 MG/1
150 TABLET ORAL 2 TIMES DAILY
Qty: 60 TABLET | Refills: 1 | Status: SHIPPED | OUTPATIENT
Start: 2022-09-19 | End: 2022-09-22

## 2022-09-19 NOTE — PROGRESS NOTES
Hugo See (:  1975) is a 49 y.o. male,Established patient, here for evaluation of the following chief complaint(s):  Follow-up and Medication Refill (Wellbutrin )      ASSESSMENT/PLAN:  1. Tobacco use disorder  Patient is feeling ready to quit. Patient would like to try Wellbutrin again. Patient to take 1 tablet a day for 3 days and then increase to 1 tablet twice a day. Patient discussed setting a quit date 1 week after starting the medication. Patient to notify me with any side effects.  -     buPROPion (WELLBUTRIN XL) 150 MG extended release tablet; Take 1 tablet by mouth 2 times daily, Disp-60 tablet, R-1Normal  2. Mixed hyperlipidemia  Patient currently on aspirin and statin. Awaiting more recent lipid panel. Patient to continue statin. We will obtain a cardiac calcium score. Patient can continue aspirin for now. Depending on these results may discontinue aspirin.  -     CT CARDIAC CALCIUM SCORING; Future  3. Primary hypertension  Blood pressure well controlled in the office today. Continue lisinopril  4. Fatty liver  Awaiting lab results. Patient had them drawn today. 5. Type 2 diabetes mellitus without complication, without long-term current use of insulin ([facility] [address]  Has been well controlled on Actos. Awaiting lab results. Continue Actos. Return in about 6 months (around 3/19/2023). SUBJECTIVE/OBJECTIVE:  HPI    Patient is a 79-year-old male with past medical history of diabetes, hypertension, fatty liver disease, hyperlipidemia and tobacco abuse who presents for follow-up for the above listed chronic diseases. Patient notes that he is ready to stop smoking. He has used Wellbutrin in the past without good results. Patient is now feeling ready to quit again. Patient would like to try Wellbutrin again. Patient also notes that he is otherwise doing well. Review of Systems ROS negative except for those noted in the HPI above.        Vitals:    22 1245   BP: 136/80 Pulse: 96   SpO2: 97%     Physical Exam  Constitutional:       General: He is not in acute distress. Appearance: Normal appearance. He is not ill-appearing. HENT:      Head: Normocephalic and atraumatic. Eyes:      General: No scleral icterus. Extraocular Movements: Extraocular movements intact. Conjunctiva/sclera: Conjunctivae normal.   Cardiovascular:      Rate and Rhythm: Normal rate and regular rhythm. Pulses: Normal pulses. Heart sounds: No murmur heard. No friction rub. No gallop. Pulmonary:      Effort: Pulmonary effort is normal. No respiratory distress. Breath sounds: No wheezing, rhonchi or rales. Abdominal:      General: Bowel sounds are normal. There is no distension. Palpations: Abdomen is soft. Tenderness: There is no abdominal tenderness. There is no guarding or rebound. Musculoskeletal:      Cervical back: Normal range of motion. No muscular tenderness. Right lower leg: No edema. Left lower leg: No edema. Lymphadenopathy:      Cervical: No cervical adenopathy. Skin:     General: Skin is warm. Findings: No erythema or rash. Neurological:      General: No focal deficit present. Mental Status: He is alert and oriented to person, place, and time. Psychiatric:         Mood and Affect: Mood normal.         Behavior: Behavior normal.       An electronic signature was used to authenticate this note.     --Faith Huerta, DO

## 2022-09-21 DIAGNOSIS — G57.90 NEUROPATHY OF FOOT, UNSPECIFIED LATERALITY: ICD-10-CM

## 2022-09-21 NOTE — TELEPHONE ENCOUNTER
Discussed with patient that we just received this request from the pharmacy today at 3:23 no other request has been received. Explained to patient that his insurance is not wanting to cover Wellbutrin 150 mg BID so we are waiting for Dr Maria Del Rosario Antonio to ok the 300mg QD instead. I asked patient to advise his wife of this as well and we are sorry for the delay.  He understood no further questions

## 2022-09-21 NOTE — TELEPHONE ENCOUNTER
Patient's wife is calling regarding the prescription for the buPROPion (WELLBUTRIN XL) 150 MG extended release tablet     She is frustrated because the pharmacy states they have tried contacting our office several times for this but have not received a response. Please contact pharmacy as soon as possible as patient is out of this prescription.

## 2022-09-21 NOTE — TELEPHONE ENCOUNTER
The Pharmacist at [facility] is calling regarding the prescription:  buPROPion (WELLBUTRIN XL) 150 MG extended release tablet    She states that prescription will not be covered by patient's insurance. It would have to be called in as the following in order to be covered by his insurance:  buPROPion 300 MG tablet 1x daily     Please contact the Pharmacy to further discuss.

## 2022-09-22 RX ORDER — GABAPENTIN 300 MG/1
300 CAPSULE ORAL 2 TIMES DAILY
Qty: 60 CAPSULE | Refills: 0 | Status: SHIPPED | OUTPATIENT
Start: 2022-09-22 | End: 2022-10-19

## 2022-09-22 RX ORDER — BUPROPION HYDROCHLORIDE 150 MG/1
150 TABLET, EXTENDED RELEASE ORAL 2 TIMES DAILY
Qty: 60 TABLET | Refills: 3 | Status: SHIPPED | OUTPATIENT
Start: 2022-09-22

## 2022-09-22 NOTE — TELEPHONE ENCOUNTER
I would like to see if his insurance will cover the 150mg SR BID (instead of the 150mg extended release) prior to doing 300mg QD. I think he will have better results with this if his insurance will allow it. Please update patient that we will reach out to pharmacy to see if this will be covered by his insurance. If not we can consider just 150mg daily (as he did before) prior to do 300mg daily. Thank you.

## 2022-09-22 NOTE — TELEPHONE ENCOUNTER
Spoke to Kathryn Mcarthur at the pharmacy and they stated that the  wellbutrin SR is covered.  Patient was called and notified

## 2022-09-22 NOTE — TELEPHONE ENCOUNTER
Patient is asking for refills on gabapentin     Last appointment: 9/19/2022  Next appointment: 3/20/2023  Last refill: 2/22/22

## 2022-09-30 ENCOUNTER — TELEPHONE (OUTPATIENT)
Dept: INTERNAL MEDICINE CLINIC | Age: 47
End: 2022-09-30

## 2022-09-30 DIAGNOSIS — E11.9 TYPE 2 DIABETES MELLITUS WITHOUT COMPLICATION, WITHOUT LONG-TERM CURRENT USE OF INSULIN (HCC): Primary | ICD-10-CM

## 2022-10-10 DIAGNOSIS — E11.9 TYPE 2 DIABETES MELLITUS WITHOUT COMPLICATION, WITHOUT LONG-TERM CURRENT USE OF INSULIN (HCC): ICD-10-CM

## 2022-10-10 LAB
ANION GAP SERPL CALCULATED.3IONS-SCNC: 13 MMOL/L (ref 3–16)
BUN BLDV-MCNC: 17 MG/DL (ref 7–20)
CALCIUM SERPL-MCNC: 9.9 MG/DL (ref 8.3–10.6)
CHLORIDE BLD-SCNC: 104 MMOL/L (ref 99–110)
CO2: 27 MMOL/L (ref 21–32)
CREAT SERPL-MCNC: 1.1 MG/DL (ref 0.9–1.3)
GFR AFRICAN AMERICAN: >60
GFR NON-AFRICAN AMERICAN: >60
GLUCOSE BLD-MCNC: 127 MG/DL (ref 70–99)
POTASSIUM SERPL-SCNC: 5.1 MMOL/L (ref 3.5–5.1)
SODIUM BLD-SCNC: 144 MMOL/L (ref 136–145)

## 2022-10-19 DIAGNOSIS — G57.90 NEUROPATHY OF FOOT, UNSPECIFIED LATERALITY: ICD-10-CM

## 2022-10-19 RX ORDER — GABAPENTIN 300 MG/1
300 CAPSULE ORAL 2 TIMES DAILY
Qty: 60 CAPSULE | Refills: 0 | Status: SHIPPED | OUTPATIENT
Start: 2022-10-19 | End: 2022-11-18

## 2022-11-16 RX ORDER — BUPROPION HYDROCHLORIDE 150 MG/1
150 TABLET, EXTENDED RELEASE ORAL 2 TIMES DAILY
Qty: 180 TABLET | Refills: 1 | Status: SHIPPED | OUTPATIENT
Start: 2022-11-16

## 2022-11-16 NOTE — TELEPHONE ENCOUNTER
Last appointment: 9/19/2022  Next appointment: 3/20/2023  Last refill: 09/22/2022    Pt requesting a 90 day supply to be sent over

## 2023-02-22 DIAGNOSIS — G57.90 NEUROPATHY OF FOOT, UNSPECIFIED LATERALITY: ICD-10-CM

## 2023-02-22 DIAGNOSIS — E11.9 TYPE 2 DIABETES MELLITUS WITH HEMOGLOBIN A1C GOAL OF 7.0%-8.0% (HCC): ICD-10-CM

## 2023-02-22 RX ORDER — BUPROPION HYDROCHLORIDE 150 MG/1
150 TABLET, EXTENDED RELEASE ORAL 2 TIMES DAILY
Qty: 180 TABLET | Refills: 1 | Status: SHIPPED | OUTPATIENT
Start: 2023-02-22

## 2023-02-22 RX ORDER — PRAVASTATIN SODIUM 20 MG
20 TABLET ORAL DAILY
Qty: 90 TABLET | Refills: 3 | Status: SHIPPED | OUTPATIENT
Start: 2023-02-22

## 2023-02-22 RX ORDER — LISINOPRIL 10 MG/1
TABLET ORAL
Qty: 90 TABLET | Refills: 3 | Status: SHIPPED | OUTPATIENT
Start: 2023-02-22

## 2023-02-22 NOTE — TELEPHONE ENCOUNTER
Patient is calling because he needs re-fills on Bupropion, Lisinopril and Pravastatin sent to [facility]. Jeremy Silk was last filled by  on 11/16. The other 2 medications were last filled by Je Bello on 2/7/2022 so will need new RX's. Last visit:  9/19/2022. Next visit: 3/20/2023. Any questions/concerns please call  patient at number provided.

## 2023-02-24 DIAGNOSIS — E11.9 TYPE 2 DIABETES MELLITUS WITH HEMOGLOBIN A1C GOAL OF 7.0%-8.0% (HCC): ICD-10-CM

## 2023-02-24 DIAGNOSIS — G57.90 NEUROPATHY OF FOOT, UNSPECIFIED LATERALITY: ICD-10-CM

## 2023-02-24 RX ORDER — PIOGLITAZONEHYDROCHLORIDE 30 MG/1
TABLET ORAL
Qty: 90 TABLET | Refills: 3 | Status: SHIPPED | OUTPATIENT
Start: 2023-02-24

## 2023-02-24 RX ORDER — GABAPENTIN 300 MG/1
300 CAPSULE ORAL 2 TIMES DAILY
Qty: 60 CAPSULE | Refills: 0 | Status: SHIPPED | OUTPATIENT
Start: 2023-02-24 | End: 2023-03-26

## 2023-02-24 NOTE — TELEPHONE ENCOUNTER
Last appointment: 9/19/2022  Next appointment: 3/20/2023  Last refill: 10/19/2022- gabapentin                     2/7/2022- Actos - [place]

## 2023-02-24 NOTE — TELEPHONE ENCOUNTER
Patient is calling because he recently had several medications sent in to be re-filled via Dr. Tyrell Goetz that were previously done by Tiago Renteria. However,  the Pioglitazone was not included in this regimen. Can we please send in new Rx for Pioglitazone as well as Gabapentin (coming due soon) [facility] [place]? Please call patient with any questions/concerns.

## 2023-03-20 ENCOUNTER — OFFICE VISIT (OUTPATIENT)
Dept: INTERNAL MEDICINE CLINIC | Age: 48
End: 2023-03-20
Payer: COMMERCIAL

## 2023-03-20 VITALS
RESPIRATION RATE: 16 BRPM | HEIGHT: 68 IN | TEMPERATURE: 97.2 F | SYSTOLIC BLOOD PRESSURE: 136 MMHG | HEART RATE: 107 BPM | WEIGHT: 221 LBS | DIASTOLIC BLOOD PRESSURE: 88 MMHG | OXYGEN SATURATION: 97 % | BODY MASS INDEX: 33.49 KG/M2

## 2023-03-20 DIAGNOSIS — E78.2 MIXED HYPERLIPIDEMIA: ICD-10-CM

## 2023-03-20 DIAGNOSIS — I10 PRIMARY HYPERTENSION: Primary | ICD-10-CM

## 2023-03-20 DIAGNOSIS — F17.200 TOBACCO DEPENDENCE: ICD-10-CM

## 2023-03-20 DIAGNOSIS — E11.9 TYPE 2 DIABETES MELLITUS WITHOUT COMPLICATION, WITHOUT LONG-TERM CURRENT USE OF INSULIN (HCC): ICD-10-CM

## 2023-03-20 DIAGNOSIS — E11.40 TYPE 2 DIABETES MELLITUS WITH DIABETIC NEUROPATHY, WITHOUT LONG-TERM CURRENT USE OF INSULIN (HCC): ICD-10-CM

## 2023-03-20 PROCEDURE — 3074F SYST BP LT 130 MM HG: CPT | Performed by: INTERNAL MEDICINE

## 2023-03-20 PROCEDURE — 99214 OFFICE O/P EST MOD 30 MIN: CPT | Performed by: INTERNAL MEDICINE

## 2023-03-20 PROCEDURE — 3051F HG A1C>EQUAL 7.0%<8.0%: CPT | Performed by: INTERNAL MEDICINE

## 2023-03-20 PROCEDURE — 3078F DIAST BP <80 MM HG: CPT | Performed by: INTERNAL MEDICINE

## 2023-03-20 SDOH — ECONOMIC STABILITY: INCOME INSECURITY: HOW HARD IS IT FOR YOU TO PAY FOR THE VERY BASICS LIKE FOOD, HOUSING, MEDICAL CARE, AND HEATING?: NOT HARD AT ALL

## 2023-03-20 SDOH — ECONOMIC STABILITY: FOOD INSECURITY: WITHIN THE PAST 12 MONTHS, THE FOOD YOU BOUGHT JUST DIDN'T LAST AND YOU DIDN'T HAVE MONEY TO GET MORE.: NEVER TRUE

## 2023-03-20 SDOH — ECONOMIC STABILITY: HOUSING INSECURITY
IN THE LAST 12 MONTHS, WAS THERE A TIME WHEN YOU DID NOT HAVE A STEADY PLACE TO SLEEP OR SLEPT IN A SHELTER (INCLUDING NOW)?: NO

## 2023-03-20 SDOH — ECONOMIC STABILITY: FOOD INSECURITY: WITHIN THE PAST 12 MONTHS, YOU WORRIED THAT YOUR FOOD WOULD RUN OUT BEFORE YOU GOT MONEY TO BUY MORE.: NEVER TRUE

## 2023-03-20 ASSESSMENT — PATIENT HEALTH QUESTIONNAIRE - PHQ9
SUM OF ALL RESPONSES TO PHQ QUESTIONS 1-9: 0
SUM OF ALL RESPONSES TO PHQ QUESTIONS 1-9: 0
2. FEELING DOWN, DEPRESSED OR HOPELESS: 0
1. LITTLE INTEREST OR PLEASURE IN DOING THINGS: 0
SUM OF ALL RESPONSES TO PHQ QUESTIONS 1-9: 0
SUM OF ALL RESPONSES TO PHQ9 QUESTIONS 1 & 2: 0
SUM OF ALL RESPONSES TO PHQ QUESTIONS 1-9: 0

## 2023-03-20 NOTE — PROGRESS NOTES
Jyoti Garduno (:  1975) is a 49 y.o. male,Established patient, here for evaluation of the following chief complaint(s):  No chief complaint on file. ASSESSMENT/PLAN:  1. Primary hypertension  Patient initially hypertensive in my office however improved on recheck. We will continue current lisinopril dose. May need to consider increase in medication in the future. Patient to first try limiting his salt intake and losing a little bit of weight that he has gained recently. Check CMP. Follow-up in 3 months. -     Comprehensive Metabolic Panel; Future  2. Type 2 diabetes mellitus without complication, without long-term current use of insulin (HCC)  Well-controlled on current medications. Patient to continue these medications. Check CMP and hemoglobin A1c  -     Comprehensive Metabolic Panel; Future  -     Hemoglobin A1C; Future  3. Tobacco dependence  Improvement overall. Patient to continue to try for cessation of smoking completely. Continue Wellbutrin. 4. Mixed hyperlipidemia  Continue statin check lipid panel  -     Lipid Panel; Future  5. Type 2 diabetes mellitus with diabetic neuropathy, without long-term current use of insulin ([facility]  #2 above  Return in about 3 months (around 2023). SUBJECTIVE/OBJECTIVE:  HPI    Patient is a 79-year-old male with past medical history of hypertension, diabetes, hyperlipidemia and tobacco use disorder who presents secondary to follow-up for the above listed chronic diseases. Does not check blood sugars at home. Seems to be stable. Gain weight. Patient worried that this will have caused an increase in his hemoglobin A1c. Privstatin: doing well no aches or pains. Patient notes that he has decreased his smoking significantly however has not been able to completely stop. He notes going on maximum of 2 days without smoking. He notes he has stopped smoking completely at work.   He does think that the Wellbutrin is helping with this portion of

## 2023-05-04 DIAGNOSIS — G57.90 NEUROPATHY OF FOOT, UNSPECIFIED LATERALITY: ICD-10-CM

## 2023-05-04 RX ORDER — GABAPENTIN 300 MG/1
300 CAPSULE ORAL 2 TIMES DAILY
Qty: 60 CAPSULE | Refills: 0 | Status: SHIPPED | OUTPATIENT
Start: 2023-05-04 | End: 2023-06-03

## 2023-05-04 NOTE — TELEPHONE ENCOUNTER
Patient is calling in for Refill on:   gabapentin (NEURONTIN) 300 MG capsule  Last Appointment : 3/20/23  Next Appointment : 6/19/23  Last Refill : 2/24/23      Please send to :  [facility] [address] [facility] [place] [facility], [address] [place] [address]   [address] [facility], [address]   Phone:  [phone]  Fax:  [phone]

## 2023-06-19 ENCOUNTER — OFFICE VISIT (OUTPATIENT)
Dept: INTERNAL MEDICINE CLINIC | Age: 48
End: 2023-06-19
Payer: COMMERCIAL

## 2023-06-19 VITALS
HEART RATE: 97 BPM | BODY MASS INDEX: 32.54 KG/M2 | SYSTOLIC BLOOD PRESSURE: 120 MMHG | OXYGEN SATURATION: 96 % | DIASTOLIC BLOOD PRESSURE: 80 MMHG | WEIGHT: 214 LBS

## 2023-06-19 DIAGNOSIS — I10 PRIMARY HYPERTENSION: ICD-10-CM

## 2023-06-19 DIAGNOSIS — F17.200 TOBACCO DEPENDENCE: ICD-10-CM

## 2023-06-19 DIAGNOSIS — B07.0 PLANTAR WART: ICD-10-CM

## 2023-06-19 DIAGNOSIS — I49.9 IRREGULAR HEARTBEAT: ICD-10-CM

## 2023-06-19 DIAGNOSIS — I10 PRIMARY HYPERTENSION: Primary | ICD-10-CM

## 2023-06-19 DIAGNOSIS — E11.40 TYPE 2 DIABETES MELLITUS WITH DIABETIC NEUROPATHY, WITHOUT LONG-TERM CURRENT USE OF INSULIN (HCC): ICD-10-CM

## 2023-06-19 DIAGNOSIS — F33.0 MAJOR DEPRESSIVE DISORDER, RECURRENT, MILD (HCC): ICD-10-CM

## 2023-06-19 PROCEDURE — 3044F HG A1C LEVEL LT 7.0%: CPT | Performed by: INTERNAL MEDICINE

## 2023-06-19 PROCEDURE — 99214 OFFICE O/P EST MOD 30 MIN: CPT | Performed by: INTERNAL MEDICINE

## 2023-06-19 PROCEDURE — 93000 ELECTROCARDIOGRAM COMPLETE: CPT | Performed by: INTERNAL MEDICINE

## 2023-06-19 PROCEDURE — 3074F SYST BP LT 130 MM HG: CPT | Performed by: INTERNAL MEDICINE

## 2023-06-19 PROCEDURE — 3078F DIAST BP <80 MM HG: CPT | Performed by: INTERNAL MEDICINE

## 2023-06-19 NOTE — PROGRESS NOTES
Arlen Barcenas (:  1975) is a 49 y.o. male,Established patient, here for evaluation of the following chief complaint(s):  3 Month Follow-Up (Wart on right foot comes and goes.)        ASSESSMENT/PLAN:  1. Primary hypertension  Well controlled on recheck. Continue current medications. Check bmp  -     Basic Metabolic Panel; Future  2. Type 2 diabetes mellitus with diabetic neuropathy, without long-term current use of insulin ([address]  Has been well controlled. Continue current medications. Check bmp, urine microalbumin, foot exam completed in office .   -     Hemoglobin A1C; Future  -     Basic Metabolic Panel; Future  -     Microalbumin / Creatinine Urine Ratio  -      DIABETES FOOT EXAM  -     Amb External Referral To Podiatry  3. Major depressive disorder, recurrent, mild  Well controlled continue wellbutrin  4. Plantar wart  Referral given to podiatry   -     Amb External Referral To Podiatry  5. Tobacco dependence  Discussed cessation  6. Irregular heartbeat  [place] on exam. Patient asymptomatic. Pac seen on EKG. Education on caffeine.   -     EKG 12 Lead      Return in about 6 months (around 2023), or if symptoms worsen or fail to improve. SUBJECTIVE/OBJECTIVE:  HPI    Patient is a 50 yo m with pmhx of htn, depression, and diabetes who presents for follow up for the above listed chronic diseases. HTN: bp has been good most days at home     DM: eating a little better. Does not check blood sugars at home. Does have wart on bottom of foot. Topical stuff at home not working. Patient denies feeling any palpations. Denies any chest pain    Review of Systems ROS negative except for those noted in the HPI above. Vitals:    23 1252 23 1312   BP: (!) 142/81 120/80   Pulse: 97    SpO2: 96%    Weight: 214 lb (97.1 kg)      Physical Exam  Constitutional:       General: He is not in acute distress. Appearance: Normal appearance. He is not ill-appearing.    HENT:      Head:

## 2023-06-20 DIAGNOSIS — E87.5 HYPERKALEMIA: Primary | ICD-10-CM

## 2023-06-20 LAB
ANION GAP SERPL CALCULATED.3IONS-SCNC: 12 MMOL/L (ref 3–16)
BUN SERPL-MCNC: 18 MG/DL (ref 7–20)
CALCIUM SERPL-MCNC: 9.8 MG/DL (ref 8.3–10.6)
CHLORIDE SERPL-SCNC: 106 MMOL/L (ref 99–110)
CO2 SERPL-SCNC: 25 MMOL/L (ref 21–32)
CREAT SERPL-MCNC: 1.1 MG/DL (ref 0.9–1.3)
CREAT UR-MCNC: 90 MG/DL (ref 39–259)
EST. AVERAGE GLUCOSE BLD GHB EST-MCNC: 142.7 MG/DL
GFR SERPLBLD CREATININE-BSD FMLA CKD-EPI: >60 ML/MIN/{1.73_M2}
GLUCOSE SERPL-MCNC: 158 MG/DL (ref 70–99)
HBA1C MFR BLD: 6.6 %
MICROALBUMIN UR DL<=1MG/L-MCNC: <1.2 MG/DL
MICROALBUMIN/CREAT UR: NORMAL MG/G (ref 0–30)
POTASSIUM SERPL-SCNC: 5.6 MMOL/L (ref 3.5–5.1)
SODIUM SERPL-SCNC: 143 MMOL/L (ref 136–145)

## 2023-06-22 ENCOUNTER — TELEPHONE (OUTPATIENT)
Dept: INTERNAL MEDICINE CLINIC | Age: 48
End: 2023-06-22

## 2023-06-22 NOTE — TELEPHONE ENCOUNTER
Patient is calling in with questions for Dr. Beto Dowell. He is asking for guidance on what should he be eating. States that [employer] has him very confused for his elevated potassium. Patient stated that the Do's and Don't can be sent via My Chart. He is very concerned due to him having diabetes and already restricted to a diet but would like to decrease potassium level. Please send a list via My Chart or please call patient to explain in depth.

## 2023-06-27 ENCOUNTER — TELEPHONE (OUTPATIENT)
Dept: INTERNAL MEDICINE CLINIC | Age: 48
End: 2023-06-27

## 2023-06-27 DIAGNOSIS — E11.9 TYPE 2 DIABETES MELLITUS WITHOUT COMPLICATION, WITHOUT LONG-TERM CURRENT USE OF INSULIN (HCC): Primary | ICD-10-CM

## 2023-07-07 DIAGNOSIS — E87.5 HYPERKALEMIA: ICD-10-CM

## 2023-07-08 LAB
ANION GAP SERPL CALCULATED.3IONS-SCNC: 11 MMOL/L (ref 3–16)
BUN SERPL-MCNC: 18 MG/DL (ref 7–20)
CALCIUM SERPL-MCNC: 9.7 MG/DL (ref 8.3–10.6)
CHLORIDE SERPL-SCNC: 103 MMOL/L (ref 99–110)
CO2 SERPL-SCNC: 27 MMOL/L (ref 21–32)
CREAT SERPL-MCNC: 1 MG/DL (ref 0.9–1.3)
GFR SERPLBLD CREATININE-BSD FMLA CKD-EPI: >60 ML/MIN/{1.73_M2}
GLUCOSE SERPL-MCNC: 105 MG/DL (ref 70–99)
POTASSIUM SERPL-SCNC: 4.6 MMOL/L (ref 3.5–5.1)
SODIUM SERPL-SCNC: 141 MMOL/L (ref 136–145)

## 2023-10-06 RX ORDER — BUPROPION HYDROCHLORIDE 150 MG/1
150 TABLET, EXTENDED RELEASE ORAL 2 TIMES DAILY
Qty: 60 TABLET | Refills: 0 | Status: SHIPPED | OUTPATIENT
Start: 2023-10-06

## 2023-10-06 NOTE — TELEPHONE ENCOUNTER
Patient is calling back to let us know that he does have a new PCP  in [place]  and he will be seeing his PCP at the end of this month. Patient aware his new PCP will have to take over his medication refills once he sees them he would just like to have medication refill to hold him off until then.

## (undated) DEVICE — ENDO CARRY-ON PROCEDURE KIT INCLUDES SUCTION TUBING, LUBRICANT, GAUZE, BIOHAZARD STICKER, TRANSPORT PAD AND INTERCEPT BEDSIDE KIT.: Brand: ENDO CARRY-ON PROCEDURE KIT

## (undated) DEVICE — FORCEP BX STD CAP 240CM RAD JAW 4

## (undated) DEVICE — CONMED SCOPE SAVER BITE BLOCK, 20X27 MM: Brand: SCOPE SAVER